# Patient Record
Sex: FEMALE | Race: WHITE | Employment: OTHER | URBAN - METROPOLITAN AREA
[De-identification: names, ages, dates, MRNs, and addresses within clinical notes are randomized per-mention and may not be internally consistent; named-entity substitution may affect disease eponyms.]

---

## 2017-03-30 ENCOUNTER — OFFICE VISIT (OUTPATIENT)
Dept: CARDIOLOGY CLINIC | Age: 67
End: 2017-03-30

## 2017-03-30 VITALS
SYSTOLIC BLOOD PRESSURE: 140 MMHG | HEIGHT: 63 IN | WEIGHT: 164.8 LBS | DIASTOLIC BLOOD PRESSURE: 70 MMHG | HEART RATE: 62 BPM | BODY MASS INDEX: 29.2 KG/M2

## 2017-03-30 DIAGNOSIS — I10 ESSENTIAL HYPERTENSION: Primary | ICD-10-CM

## 2017-03-30 DIAGNOSIS — I77.810 AORTIC ROOT DILATION (HCC): ICD-10-CM

## 2017-03-30 DIAGNOSIS — R00.2 PALPITATIONS: ICD-10-CM

## 2017-03-30 PROCEDURE — G8484 FLU IMMUNIZE NO ADMIN: HCPCS | Performed by: INTERNAL MEDICINE

## 2017-03-30 PROCEDURE — 1036F TOBACCO NON-USER: CPT | Performed by: INTERNAL MEDICINE

## 2017-03-30 PROCEDURE — 3014F SCREEN MAMMO DOC REV: CPT | Performed by: INTERNAL MEDICINE

## 2017-03-30 PROCEDURE — 1123F ACP DISCUSS/DSCN MKR DOCD: CPT | Performed by: INTERNAL MEDICINE

## 2017-03-30 PROCEDURE — G8399 PT W/DXA RESULTS DOCUMENT: HCPCS | Performed by: INTERNAL MEDICINE

## 2017-03-30 PROCEDURE — 3017F COLORECTAL CA SCREEN DOC REV: CPT | Performed by: INTERNAL MEDICINE

## 2017-03-30 PROCEDURE — 1090F PRES/ABSN URINE INCON ASSESS: CPT | Performed by: INTERNAL MEDICINE

## 2017-03-30 PROCEDURE — G8427 DOCREV CUR MEDS BY ELIG CLIN: HCPCS | Performed by: INTERNAL MEDICINE

## 2017-03-30 PROCEDURE — 4040F PNEUMOC VAC/ADMIN/RCVD: CPT | Performed by: INTERNAL MEDICINE

## 2017-03-30 PROCEDURE — G8598 ASA/ANTIPLAT THER USED: HCPCS | Performed by: INTERNAL MEDICINE

## 2017-03-30 PROCEDURE — G8420 CALC BMI NORM PARAMETERS: HCPCS | Performed by: INTERNAL MEDICINE

## 2017-03-30 PROCEDURE — 99214 OFFICE O/P EST MOD 30 MIN: CPT | Performed by: INTERNAL MEDICINE

## 2017-05-02 ENCOUNTER — OFFICE VISIT (OUTPATIENT)
Dept: FAMILY MEDICINE CLINIC | Age: 67
End: 2017-05-02

## 2017-05-02 VITALS
HEART RATE: 66 BPM | WEIGHT: 163 LBS | BODY MASS INDEX: 28.88 KG/M2 | DIASTOLIC BLOOD PRESSURE: 72 MMHG | SYSTOLIC BLOOD PRESSURE: 124 MMHG | HEIGHT: 63 IN | RESPIRATION RATE: 16 BRPM | TEMPERATURE: 97.3 F | OXYGEN SATURATION: 97 %

## 2017-05-02 DIAGNOSIS — E78.1 HYPERTRIGLYCERIDEMIA: Chronic | ICD-10-CM

## 2017-05-02 DIAGNOSIS — G89.4 CHRONIC PAIN SYNDROME: ICD-10-CM

## 2017-05-02 DIAGNOSIS — M85.80 OSTEOPENIA: ICD-10-CM

## 2017-05-02 DIAGNOSIS — Z00.00 MEDICARE WELCOME EXAM: Primary | ICD-10-CM

## 2017-05-02 LAB
ANION GAP SERPL CALCULATED.3IONS-SCNC: 12 MMOL/L (ref 3–16)
BUN BLDV-MCNC: 26 MG/DL (ref 7–20)
CALCIUM SERPL-MCNC: 10.1 MG/DL (ref 8.3–10.6)
CHLORIDE BLD-SCNC: 104 MMOL/L (ref 99–110)
CHOLESTEROL, TOTAL: 155 MG/DL (ref 0–199)
CO2: 26 MMOL/L (ref 21–32)
CREAT SERPL-MCNC: 0.8 MG/DL (ref 0.6–1.2)
GFR AFRICAN AMERICAN: >60
GFR NON-AFRICAN AMERICAN: >60
GLUCOSE BLD-MCNC: 103 MG/DL (ref 70–99)
HDLC SERPL-MCNC: 44 MG/DL (ref 40–60)
LDL CHOLESTEROL CALCULATED: 83 MG/DL
POTASSIUM SERPL-SCNC: 4.1 MMOL/L (ref 3.5–5.1)
SODIUM BLD-SCNC: 142 MMOL/L (ref 136–145)
TRIGL SERPL-MCNC: 141 MG/DL (ref 0–150)
VLDLC SERPL CALC-MCNC: 28 MG/DL

## 2017-05-02 PROCEDURE — G0403 EKG FOR INITIAL PREVENT EXAM: HCPCS | Performed by: FAMILY MEDICINE

## 2017-05-02 PROCEDURE — 90732 PPSV23 VACC 2 YRS+ SUBQ/IM: CPT | Performed by: FAMILY MEDICINE

## 2017-05-02 PROCEDURE — G0009 ADMIN PNEUMOCOCCAL VACCINE: HCPCS | Performed by: FAMILY MEDICINE

## 2017-05-02 PROCEDURE — G0402 INITIAL PREVENTIVE EXAM: HCPCS | Performed by: FAMILY MEDICINE

## 2017-05-02 RX ORDER — HYDROCODONE BITARTRATE AND ACETAMINOPHEN 5; 325 MG/1; MG/1
1 TABLET ORAL EVERY 8 HOURS PRN
Qty: 45 TABLET | Refills: 0 | Status: CANCELLED | OUTPATIENT
Start: 2017-05-02

## 2017-05-02 RX ORDER — HYDROCODONE BITARTRATE AND ACETAMINOPHEN 5; 325 MG/1; MG/1
1 TABLET ORAL EVERY 8 HOURS PRN
Qty: 30 TABLET | Refills: 0 | Status: SHIPPED | OUTPATIENT
Start: 2017-05-02 | End: 2020-09-16

## 2017-05-02 ASSESSMENT — PATIENT HEALTH QUESTIONNAIRE - PHQ9: SUM OF ALL RESPONSES TO PHQ QUESTIONS 1-9: 0

## 2017-05-02 ASSESSMENT — LIFESTYLE VARIABLES
HOW OFTEN DO YOU HAVE A DRINK CONTAINING ALCOHOL: 0
HAVE YOU OR SOMEONE ELSE BEEN INJURED AS A RESULT OF YOUR DRINKING: 0
HOW OFTEN DURING THE LAST YEAR HAVE YOU FAILED TO DO WHAT WAS NORMALLY EXPECTED FROM YOU BECAUSE OF DRINKING: 0
AUDIT-C TOTAL SCORE: 0
AUDIT TOTAL SCORE: 0
HOW MANY STANDARD DRINKS CONTAINING ALCOHOL DO YOU HAVE ON A TYPICAL DAY: 0
HOW OFTEN DURING THE LAST YEAR HAVE YOU BEEN UNABLE TO REMEMBER WHAT HAPPENED THE NIGHT BEFORE BECAUSE YOU HAD BEEN DRINKING: 0
HAS A RELATIVE, FRIEND, DOCTOR, OR ANOTHER HEALTH PROFESSIONAL EXPRESSED CONCERN ABOUT YOUR DRINKING OR SUGGESTED YOU CUT DOWN: 0
HOW OFTEN DURING THE LAST YEAR HAVE YOU HAD A FEELING OF GUILT OR REMORSE AFTER DRINKING: 0
HOW OFTEN DO YOU HAVE SIX OR MORE DRINKS ON ONE OCCASION: 0
HOW OFTEN DURING THE LAST YEAR HAVE YOU FOUND THAT YOU WERE NOT ABLE TO STOP DRINKING ONCE YOU HAD STARTED: 0
HOW OFTEN DURING THE LAST YEAR HAVE YOU NEEDED AN ALCOHOLIC DRINK FIRST THING IN THE MORNING TO GET YOURSELF GOING AFTER A NIGHT OF HEAVY DRINKING: 0

## 2017-05-02 ASSESSMENT — ANXIETY QUESTIONNAIRES: GAD7 TOTAL SCORE: 1

## 2017-05-10 ENCOUNTER — HOSPITAL ENCOUNTER (OUTPATIENT)
Dept: GENERAL RADIOLOGY | Age: 67
Discharge: HOME | End: 2017-05-10
Attending: FAMILY MEDICINE | Admitting: FAMILY MEDICINE

## 2017-05-10 DIAGNOSIS — M85.80 OSTEOPENIA: ICD-10-CM

## 2017-05-10 DIAGNOSIS — M85.80 OTHER SPECIFIED DISORDERS OF BONE DENSITY AND STRUCTURE, UNSPECIFIED SITE: ICD-10-CM

## 2017-05-11 ENCOUNTER — PATIENT MESSAGE (OUTPATIENT)
Dept: FAMILY MEDICINE CLINIC | Age: 67
End: 2017-05-11

## 2017-06-09 DIAGNOSIS — M81.0 OSTEOPOROSIS: Chronic | ICD-10-CM

## 2017-06-12 RX ORDER — ALENDRONATE SODIUM 35 MG/1
TABLET ORAL
Qty: 12 TABLET | Refills: 1 | Status: SHIPPED | OUTPATIENT
Start: 2017-06-12 | End: 2017-11-17 | Stop reason: SDUPTHER

## 2017-09-08 ENCOUNTER — OFFICE VISIT (OUTPATIENT)
Dept: CARDIOLOGY CLINIC | Age: 67
End: 2017-09-08

## 2017-09-08 ENCOUNTER — HOSPITAL ENCOUNTER (OUTPATIENT)
Dept: NON INVASIVE DIAGNOSTICS | Age: 67
Discharge: HOME | End: 2017-09-08
Attending: INTERNAL MEDICINE | Admitting: INTERNAL MEDICINE

## 2017-09-08 VITALS
WEIGHT: 164 LBS | SYSTOLIC BLOOD PRESSURE: 140 MMHG | OXYGEN SATURATION: 95 % | HEART RATE: 90 BPM | HEIGHT: 63 IN | DIASTOLIC BLOOD PRESSURE: 80 MMHG | BODY MASS INDEX: 29.06 KG/M2

## 2017-09-08 DIAGNOSIS — I10 ESSENTIAL (PRIMARY) HYPERTENSION: ICD-10-CM

## 2017-09-08 DIAGNOSIS — R93.1 ABNORMAL ECHOCARDIOGRAM: ICD-10-CM

## 2017-09-08 DIAGNOSIS — R00.2 PALPITATIONS: ICD-10-CM

## 2017-09-08 DIAGNOSIS — I10 ESSENTIAL HYPERTENSION: ICD-10-CM

## 2017-09-08 DIAGNOSIS — I77.810 DILATED AORTIC ROOT (HCC): ICD-10-CM

## 2017-09-08 DIAGNOSIS — R00.1 SINUS BRADYCARDIA: Primary | Chronic | ICD-10-CM

## 2017-09-08 LAB
LV EF: 55 %
LVEF MODALITY: NORMAL

## 2017-09-08 PROCEDURE — 3017F COLORECTAL CA SCREEN DOC REV: CPT | Performed by: INTERNAL MEDICINE

## 2017-09-08 PROCEDURE — 4040F PNEUMOC VAC/ADMIN/RCVD: CPT | Performed by: INTERNAL MEDICINE

## 2017-09-08 PROCEDURE — G8417 CALC BMI ABV UP PARAM F/U: HCPCS | Performed by: INTERNAL MEDICINE

## 2017-09-08 PROCEDURE — 99213 OFFICE O/P EST LOW 20 MIN: CPT | Performed by: INTERNAL MEDICINE

## 2017-09-08 PROCEDURE — G8598 ASA/ANTIPLAT THER USED: HCPCS | Performed by: INTERNAL MEDICINE

## 2017-09-08 PROCEDURE — G8427 DOCREV CUR MEDS BY ELIG CLIN: HCPCS | Performed by: INTERNAL MEDICINE

## 2017-09-08 PROCEDURE — G8399 PT W/DXA RESULTS DOCUMENT: HCPCS | Performed by: INTERNAL MEDICINE

## 2017-09-08 PROCEDURE — 1123F ACP DISCUSS/DSCN MKR DOCD: CPT | Performed by: INTERNAL MEDICINE

## 2017-09-08 PROCEDURE — 1090F PRES/ABSN URINE INCON ASSESS: CPT | Performed by: INTERNAL MEDICINE

## 2017-09-08 PROCEDURE — 3014F SCREEN MAMMO DOC REV: CPT | Performed by: INTERNAL MEDICINE

## 2017-09-08 PROCEDURE — 1036F TOBACCO NON-USER: CPT | Performed by: INTERNAL MEDICINE

## 2017-09-08 RX ORDER — AMLODIPINE BESYLATE 5 MG/1
5 TABLET ORAL DAILY
Qty: 90 TABLET | Refills: 3 | Status: SHIPPED | OUTPATIENT
Start: 2017-09-08 | End: 2018-03-07 | Stop reason: SDUPTHER

## 2017-09-08 RX ORDER — HYDROCHLOROTHIAZIDE 25 MG/1
25 TABLET ORAL DAILY
Qty: 90 TABLET | Refills: 3 | Status: SHIPPED | OUTPATIENT
Start: 2017-09-08 | End: 2018-09-14 | Stop reason: SDUPTHER

## 2017-11-17 DIAGNOSIS — M81.0 OSTEOPOROSIS: Chronic | ICD-10-CM

## 2017-11-17 RX ORDER — FENOFIBRATE 160 MG/1
TABLET ORAL
Qty: 90 TABLET | Refills: 3 | Status: SHIPPED | OUTPATIENT
Start: 2017-11-17 | End: 2018-09-29 | Stop reason: SDUPTHER

## 2017-11-17 RX ORDER — ALENDRONATE SODIUM 35 MG/1
TABLET ORAL
Qty: 12 TABLET | Refills: 1 | Status: SHIPPED | OUTPATIENT
Start: 2017-11-17 | End: 2018-05-31 | Stop reason: SDUPTHER

## 2018-03-05 NOTE — PROGRESS NOTES
memory, mentation, or behavior are noted      Echo 9/8/17:  Technically limited study due to lung interface. Normal left ventricle size, wall thickness and systolic function with an  estimated ejection fraction of 55%. No regional wall motion abnormalities  are seen. There is reversal of E/A inflow velocities across the mitral valve. Trivial mitral regurgitation is present. The aortic root is mildly dilated. Echo 9/15/16:  Summary   Normal left ventricle size, wall thickness and systolic function with an   estimated ejection fraction of 60%.   Trivial mitral regurgitation is present.   The aortic valve appears possibly bicuspid .   Mild aortic regurgitation is present.   The aortic root is mildly dilated. 4.1 to 4.3 cm.   Trivial tricuspid regurgitation. Gale Juan pulmonic regurgitation present.     MCOT 3/31/16:  Short run of atrial tachycardia but patient was asymptomatic  Patient reports occasional, brief skipped beats   Treatment options including medical therapy was discussed with patient. Risks, benefits and alternative of each treatment     CT Abd/Pelvis 10/7/14:  CT chest with IV      HISTORY: Abnormal echocardiogram, aortic root dilatation   Aortic root is dilated measuring 4.5 x 4.3 cm without   intimo-medial flap. Mid aortic arch segment measures 2.7 cm in   diameter and mid descending segment measures 2.4 cm. Pulmonary arterial trunk, main pulmonary arteries and proximal   arterial branches are patent. There is no intrathoracic or   axillary lymphadenopathy. Thyroid gland is normal in size. Trachea and mainstem bronchi are patent. Lungs are clear. 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 Carotid duplex 7/24/14:  Impressions   Right Impression   The right internal carotid artery demonstrates no significant stenosis.    Left Impression   The left internal carotid artery appears to have a 1-15% diameter reducing   stenosis Diltiazem  - she has occasional palpitations      Plan:   1. Increase Amlodipine to 7.5 mg daily  2. Continue to monitor BP at home and purchase new automatic BP cuff using arm measurements. Call with an update on BP measurements within the next month. 3.  Lipids to be repeated by PCP    4.  Echo in Sept 2018. 5.  Follow up in Sept 2018. Thank you for allowing me to participate in the care of this individual.    Scribe's attestation: This note was scribed in the presence of Kale Delgado M.D. by Susan Corona RN    Physician Attestation: The scribe's documentation has been prepared under my direction and personally reviewed by me in its entirety. I confirm that the note above accurately reflects all work, treatment, procedures, and medical decision making performed by me. Bernard Nguyen M.D., [facility]

## 2018-03-07 ENCOUNTER — OFFICE VISIT (OUTPATIENT)
Dept: CARDIOLOGY CLINIC | Age: 68
End: 2018-03-07

## 2018-03-07 VITALS
DIASTOLIC BLOOD PRESSURE: 90 MMHG | HEIGHT: 63 IN | OXYGEN SATURATION: 95 % | HEART RATE: 76 BPM | SYSTOLIC BLOOD PRESSURE: 148 MMHG | BODY MASS INDEX: 29.68 KG/M2 | WEIGHT: 167.5 LBS

## 2018-03-07 DIAGNOSIS — I77.810 DILATED AORTIC ROOT (HCC): Primary | ICD-10-CM

## 2018-03-07 DIAGNOSIS — E78.1 HYPERTRIGLYCERIDEMIA: ICD-10-CM

## 2018-03-07 DIAGNOSIS — I10 ESSENTIAL HYPERTENSION: ICD-10-CM

## 2018-03-07 DIAGNOSIS — R93.1 ABNORMAL ECHOCARDIOGRAM: ICD-10-CM

## 2018-03-07 DIAGNOSIS — R00.1 SINUS BRADYCARDIA: Chronic | ICD-10-CM

## 2018-03-07 DIAGNOSIS — R00.2 PALPITATIONS: ICD-10-CM

## 2018-03-07 PROCEDURE — G8399 PT W/DXA RESULTS DOCUMENT: HCPCS | Performed by: INTERNAL MEDICINE

## 2018-03-07 PROCEDURE — 3014F SCREEN MAMMO DOC REV: CPT | Performed by: INTERNAL MEDICINE

## 2018-03-07 PROCEDURE — 3017F COLORECTAL CA SCREEN DOC REV: CPT | Performed by: INTERNAL MEDICINE

## 2018-03-07 PROCEDURE — 1090F PRES/ABSN URINE INCON ASSESS: CPT | Performed by: INTERNAL MEDICINE

## 2018-03-07 PROCEDURE — G8598 ASA/ANTIPLAT THER USED: HCPCS | Performed by: INTERNAL MEDICINE

## 2018-03-07 PROCEDURE — G8417 CALC BMI ABV UP PARAM F/U: HCPCS | Performed by: INTERNAL MEDICINE

## 2018-03-07 PROCEDURE — 1036F TOBACCO NON-USER: CPT | Performed by: INTERNAL MEDICINE

## 2018-03-07 PROCEDURE — G8484 FLU IMMUNIZE NO ADMIN: HCPCS | Performed by: INTERNAL MEDICINE

## 2018-03-07 PROCEDURE — 1123F ACP DISCUSS/DSCN MKR DOCD: CPT | Performed by: INTERNAL MEDICINE

## 2018-03-07 PROCEDURE — 99214 OFFICE O/P EST MOD 30 MIN: CPT | Performed by: INTERNAL MEDICINE

## 2018-03-07 PROCEDURE — G8427 DOCREV CUR MEDS BY ELIG CLIN: HCPCS | Performed by: INTERNAL MEDICINE

## 2018-03-07 PROCEDURE — 4040F PNEUMOC VAC/ADMIN/RCVD: CPT | Performed by: INTERNAL MEDICINE

## 2018-03-09 RX ORDER — AMLODIPINE BESYLATE 5 MG/1
7.5 TABLET ORAL DAILY
Qty: 135 TABLET | Refills: 3 | Status: SHIPPED | OUTPATIENT
Start: 2018-03-09 | End: 2018-03-12 | Stop reason: SDUPTHER

## 2018-03-12 ENCOUNTER — TELEPHONE (OUTPATIENT)
Dept: CARDIOLOGY CLINIC | Age: 68
End: 2018-03-12

## 2018-03-12 DIAGNOSIS — I10 ESSENTIAL HYPERTENSION: ICD-10-CM

## 2018-03-12 DIAGNOSIS — R93.1 ABNORMAL ECHOCARDIOGRAM: ICD-10-CM

## 2018-03-12 DIAGNOSIS — R00.2 PALPITATIONS: ICD-10-CM

## 2018-03-12 DIAGNOSIS — R00.1 SINUS BRADYCARDIA: Chronic | ICD-10-CM

## 2018-03-12 RX ORDER — AMLODIPINE BESYLATE 5 MG/1
7.5 TABLET ORAL DAILY
Qty: 135 TABLET | Refills: 3 | Status: SHIPPED | OUTPATIENT
Start: 2018-03-12 | End: 2019-05-17 | Stop reason: SDUPTHER

## 2018-03-12 NOTE — TELEPHONE ENCOUNTER
Please correct the script for amLODIPine (NORVASC) 5 MG tablet that was sent to [facility] on 3/9/18    Please cancel and send to [facility]. Please call to confirm.   Thank you CSF

## 2018-03-27 ENCOUNTER — TELEPHONE (OUTPATIENT)
Dept: CARDIOLOGY CLINIC | Age: 68
End: 2018-03-27

## 2018-03-27 RX ORDER — AMLODIPINE BESYLATE 2.5 MG/1
TABLET ORAL
Qty: 30 TABLET | Refills: 5 | Status: SHIPPED | OUTPATIENT
Start: 2018-03-27 | End: 2018-09-14 | Stop reason: SDUPTHER

## 2018-03-27 NOTE — TELEPHONE ENCOUNTER
Amlodipine was increased to 7.5mg but [facility] her 5mg . The 5mg are not scored and are very hard to break. Could [facility] call in new rx for 2.5mg ? [facility] said to make sure daniel puts on the rx that she is still taking the 5mg and has them at home . She wants to take the 5mg and the 2.5mg pills together . Call to let her know this is done .

## 2018-05-14 ENCOUNTER — OFFICE VISIT (OUTPATIENT)
Dept: FAMILY MEDICINE CLINIC | Age: 68
End: 2018-05-14

## 2018-05-14 VITALS
WEIGHT: 167 LBS | OXYGEN SATURATION: 98 % | HEART RATE: 67 BPM | BODY MASS INDEX: 29.59 KG/M2 | DIASTOLIC BLOOD PRESSURE: 74 MMHG | HEIGHT: 63 IN | SYSTOLIC BLOOD PRESSURE: 110 MMHG | RESPIRATION RATE: 16 BRPM | TEMPERATURE: 97.7 F

## 2018-05-14 DIAGNOSIS — R10.13 EPIGASTRIC PAIN: ICD-10-CM

## 2018-05-14 DIAGNOSIS — G89.29 CHRONIC BILATERAL LOW BACK PAIN WITHOUT SCIATICA: ICD-10-CM

## 2018-05-14 DIAGNOSIS — Z23 NEED FOR VACCINATION: ICD-10-CM

## 2018-05-14 DIAGNOSIS — I10 BENIGN ESSENTIAL HTN: ICD-10-CM

## 2018-05-14 DIAGNOSIS — M54.50 CHRONIC BILATERAL LOW BACK PAIN WITHOUT SCIATICA: ICD-10-CM

## 2018-05-14 DIAGNOSIS — E55.9 VITAMIN D DEFICIENCY: ICD-10-CM

## 2018-05-14 DIAGNOSIS — Z00.00 MEDICARE ANNUAL WELLNESS VISIT, SUBSEQUENT: Primary | ICD-10-CM

## 2018-05-14 LAB
ANION GAP SERPL CALCULATED.3IONS-SCNC: 13 MMOL/L (ref 3–16)
BUN BLDV-MCNC: 17 MG/DL (ref 7–20)
CALCIUM SERPL-MCNC: 10.1 MG/DL (ref 8.3–10.6)
CHLORIDE BLD-SCNC: 105 MMOL/L (ref 99–110)
CHOLESTEROL, TOTAL: 160 MG/DL (ref 0–199)
CO2: 25 MMOL/L (ref 21–32)
CREAT SERPL-MCNC: 0.7 MG/DL (ref 0.6–1.2)
CREATININE URINE: 125.4 MG/DL (ref 28–259)
GFR AFRICAN AMERICAN: >60
GFR NON-AFRICAN AMERICAN: >60
GLUCOSE BLD-MCNC: 114 MG/DL (ref 70–99)
HDLC SERPL-MCNC: 40 MG/DL (ref 40–60)
LDL CHOLESTEROL CALCULATED: 89 MG/DL
LIPASE: 88 U/L (ref 13–60)
MAGNESIUM: 1.9 MG/DL (ref 1.8–2.4)
MICROALBUMIN UR-MCNC: 1.4 MG/DL
MICROALBUMIN/CREAT UR-RTO: 11.2 MG/G (ref 0–30)
POTASSIUM SERPL-SCNC: 4.3 MMOL/L (ref 3.5–5.1)
SODIUM BLD-SCNC: 143 MMOL/L (ref 136–145)
TRIGL SERPL-MCNC: 156 MG/DL (ref 0–150)
TSH SERPL DL<=0.05 MIU/L-ACNC: 0.7 UIU/ML (ref 0.27–4.2)
VITAMIN D 25-HYDROXY: 24.2 NG/ML
VLDLC SERPL CALC-MCNC: 31 MG/DL

## 2018-05-14 PROCEDURE — G0009 ADMIN PNEUMOCOCCAL VACCINE: HCPCS | Performed by: FAMILY MEDICINE

## 2018-05-14 PROCEDURE — 4040F PNEUMOC VAC/ADMIN/RCVD: CPT | Performed by: FAMILY MEDICINE

## 2018-05-14 PROCEDURE — G0439 PPPS, SUBSEQ VISIT: HCPCS | Performed by: FAMILY MEDICINE

## 2018-05-14 PROCEDURE — 90715 TDAP VACCINE 7 YRS/> IM: CPT | Performed by: FAMILY MEDICINE

## 2018-05-14 PROCEDURE — 90471 IMMUNIZATION ADMIN: CPT | Performed by: FAMILY MEDICINE

## 2018-05-14 PROCEDURE — 90670 PCV13 VACCINE IM: CPT | Performed by: FAMILY MEDICINE

## 2018-05-14 PROCEDURE — G8598 ASA/ANTIPLAT THER USED: HCPCS | Performed by: FAMILY MEDICINE

## 2018-05-14 RX ORDER — PANTOPRAZOLE SODIUM 40 MG/1
40 GRANULE, DELAYED RELEASE ORAL
COMMUNITY
End: 2020-09-16 | Stop reason: ALTCHOICE

## 2018-05-14 ASSESSMENT — PATIENT HEALTH QUESTIONNAIRE - PHQ9
2. FEELING DOWN, DEPRESSED OR HOPELESS: 0
2. FEELING DOWN, DEPRESSED OR HOPELESS: 0
1. LITTLE INTEREST OR PLEASURE IN DOING THINGS: 0
SUM OF ALL RESPONSES TO PHQ9 QUESTIONS 1 & 2: 0
1. LITTLE INTEREST OR PLEASURE IN DOING THINGS: 0
SUM OF ALL RESPONSES TO PHQ9 QUESTIONS 1 & 2: 0
SUM OF ALL RESPONSES TO PHQ9 QUESTIONS 1 & 2: 0
2. FEELING DOWN, DEPRESSED OR HOPELESS: 0
SUM OF ALL RESPONSES TO PHQ QUESTIONS 1-9: 0
1. LITTLE INTEREST OR PLEASURE IN DOING THINGS: 0
SUM OF ALL RESPONSES TO PHQ QUESTIONS 1-9: 0
SUM OF ALL RESPONSES TO PHQ QUESTIONS 1-9: 0

## 2018-05-31 DIAGNOSIS — M81.0 OSTEOPOROSIS: Chronic | ICD-10-CM

## 2018-06-01 RX ORDER — ALENDRONATE SODIUM 35 MG/1
TABLET ORAL
Qty: 12 TABLET | Refills: 1 | Status: SHIPPED | OUTPATIENT
Start: 2018-06-01 | End: 2018-11-21 | Stop reason: SDUPTHER

## 2018-09-12 NOTE — PROGRESS NOTES
tablet Take 1.5 tablets by mouth daily Yes Dedra Merino MD   fenofibrate 160 MG tablet TAKE 1 TABLET DAILY Yes Alissa Borrego MD   HYDROcodone-acetaminophen (NORCO) 5-325 MG per tablet Take 1 tablet by mouth every 8 hours as needed for Pain . Yes Alissa Borrego MD   aspirin 81 MG EC tablet Take 81 mg by mouth daily  Yes Historical Provider, MD   Multiple Vitamins-Minerals (THERAPEUTIC MULTIVITAMIN-MINERALS) tablet Take 1 tablet by mouth daily. Yes Historical Provider, MD   ibuprofen (IBU) 800 MG tablet Take 1 tablet by mouth every 8 hours as needed for Pain. Alissa Borrego MD        Allergies   Allergen Reactions    Nsaids Other (See Comments)     PUD       Past Medical History:   Diagnosis Date    Aorta aneurysm [facility]) Oct 2014    echo and CT mild ao dilatation root/arch     Cyst of left kidney Oct 2014    by CT followed by urology    Fatty liver Oct 2014    on CT     Hx of endoscopy     gastric ulcer    Hyperlipidemia     Osteoarthritis     Osteopenia     PUD (peptic ulcer disease)     nsaid     Syncope     remote, carotid us normal        Past Surgical History:   Procedure Laterality Date    CARPAL TUNNEL RELEASE      2004    COLONOSCOPY  12/16/2015    Dr. Eliseo Aguilera, mild diverticulosis    UPPER GASTROINTESTINAL ENDOSCOPY  5/9/2013    , Gastric Ulcer. Social History   Substance Use Topics    Smoking status: Former Smoker     Quit date: 11/4/2008    Smokeless tobacco: Never Used    Alcohol use No        Family History   Problem Relation Age of Onset    COPD Mother         tobacco abuse,     Cancer Father         lymphoma    Coronary Art Dis Father     Cancer Maternal Aunt         liver cancer.      Other Sister         kidney cyst,FRANZ lung disease    Cancer Daughter 40        Hodkin's (passed on Jan 012)     Thyroid Disease Daughter 45        Graves     High Blood Pressure Neg Hx     High Cholesterol Neg Hx     Heart Disease Neg Hx        PHYSICAL EXAMINATION:  Vitals:    09/14/18 1011   BP: 126/70   Site: Left Upper Arm   Position: Sitting   Cuff Size: Medium Adult   Pulse: 66   SpO2: 98%   Weight: 167 lb 12.8 oz (76.1 kg)   Height: 5' 3\" (1.6 m)     Estimated body mass index is 29.72 kg/m² as calculated from the following:    Height as of this encounter: 5' 3\" (1.6 m). Weight as of this encounter: 167 lb 12.8 oz (76.1 kg). General Appearance: No apparent distress  Eyes:  · Conjunctiva clear  · Pupils equal, round, reactive to light  ENT:  · External Ears and Nose unremarkable  · Oral mucosa is moist  Respiratory:  · Normal excursion and expansion without use of accessory muscles  · Resp Auscultation: Normal breath sounds without dullness  Cardiovascular:  · JVD is normal  · The carotid upstroke is normal in amplitude and contour without delay or bruit  · Apical impulse is not displaced  · Normal S1, S2. No S3. No Murmur  · No edema  · Pedal Pulses: 2+ and equal   Abdomen:  · No masses or tenderness  · Liver/Spleen: No Abnormalities Noted  Musculoskeletal:  · Fingers without clubbing or cyanosis  · Normal Gait  Skin:  · No rash  · Normal skin turgor   Neurologic/Psychiatric:  · Alert and oriented in all spheres  · Normal mood and affect  · Memory and mentation intact    Lab Results   Component Value Date    CHOL 160 05/14/2018    TRIG 156 05/14/2018    HDL 40 05/14/2018    HDL 44 12/15/2011    LDLCALC 89 05/14/2018     Lab Results   Component Value Date     05/14/2018    K 4.3 05/14/2018     05/14/2018    CO2 25 05/14/2018    GLUCOSE 114 05/14/2018    BUN 17 05/14/2018    CREATININE 0.7 05/14/2018    CALCIUM 10.1 05/14/2018    GFR >60 01/08/2013    GFRAA >60 05/14/2018    GFRAA >60 01/08/2013     Lab Results   Component Value Date    ALT 24 06/07/2016    AST 26 06/07/2016       Echo 9/14/18:   Summary   -Normal left ventricle size, wall thickness and systolic function with an   estimated ejection fraction of 55-60%.  No regional wall motion abnormalities   are seen.   -Normal diastolic function.   -Mild aortic regurgitation.   -Mild mitral regurgitation.   -Mild tricuspid regurgitation with a estimated RVSP of 30 mmHg.   -Trivial pulmonic regurgitation present.   -The aortic root is dilated, measuring 4.3 cm. Echo 9/8/17:  Technically limited study due to lung interface. Normal left ventricle size, wall thickness and systolic function with an  estimated ejection fraction of 55%. No regional wall motion abnormalities  are seen. There is reversal of E/A inflow velocities across the mitral valve. Trivial mitral regurgitation is present. The aortic root is mildly dilated. Echo 9/15/16:  Summary   Normal left ventricle size, wall thickness and systolic function with an   estimated ejection fraction of 60%. Trivial mitral regurgitation is present. The aortic valve appears possibly bicuspid . Mild aortic regurgitation is present. The aortic root is mildly dilated. 4.1 to 4.3 cm. Trivial tricuspid regurgitation. .   Trivial pulmonic regurgitation present. MCOT 3/31/16:  Short run of atrial tachycardia but patient was asymptomatic  Patient reports occasional, brief skipped beats   Treatment options including medical therapy was discussed with patient. Risks, benefits and alternative of each treatment      CT Abd/Pelvis 10/7/14:  CT chest with IV       HISTORY: Abnormal echocardiogram, aortic root dilatation   Aortic root is dilated measuring 4.5 x 4.3 cm without   intimo-medial flap. Mid aortic arch segment measures 2.7 cm in   diameter and mid descending segment measures 2.4 cm. Pulmonary arterial trunk, main pulmonary arteries and proximal   arterial branches are patent. There is no intrathoracic or   axillary lymphadenopathy. Thyroid gland is normal in size. Trachea and mainstem bronchi are patent. Lungs are clear.     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 TG- 156, TC- 160. Treated with Fenofibrate 160 mg daily. Plan > continue current dose of Fenofibrate. Labs monitored by PCP    4. Palpitations  ~  hx of skipped beats  ~  3/3016 Event monitor - brief AT which was asymptomatic  ~  Hx of bradycardia - not on BB or Diltiazem  ~  HR in the 50's in am and increases when active. No complaints of dizziness, good exercise tolerance    Plan > monitor for worsening palpitations. No change in medications at this time. Plan:  1. No change in medications  2. Continue to monitor BP at home  3. Will discuss follow up testing for surveillance of aortic root at next visit  4. Follow up in one year      Scribe's attestation: This note was scribed in the presence of Chanelle Bryan M.D. by Hasmukh Chandler RN    Physician Attestation: The scribe's documentation has been prepared under my direction and personally reviewed by me in its entirety. I confirm that the note above accurately reflects all work, treatment, procedures, and medical decision making performed by me. An  electronic signature was used to authenticate this note. Tomi Saldana MD, [facility] [place], [address]

## 2018-09-14 ENCOUNTER — OFFICE VISIT (OUTPATIENT)
Dept: CARDIOLOGY CLINIC | Age: 68
End: 2018-09-14

## 2018-09-14 ENCOUNTER — HOSPITAL ENCOUNTER (OUTPATIENT)
Dept: NON INVASIVE DIAGNOSTICS | Age: 68
Discharge: HOME | End: 2018-09-14
Attending: INTERNAL MEDICINE | Admitting: INTERNAL MEDICINE

## 2018-09-14 VITALS
WEIGHT: 167.8 LBS | DIASTOLIC BLOOD PRESSURE: 70 MMHG | SYSTOLIC BLOOD PRESSURE: 126 MMHG | BODY MASS INDEX: 29.73 KG/M2 | HEIGHT: 63 IN | OXYGEN SATURATION: 98 % | HEART RATE: 66 BPM

## 2018-09-14 DIAGNOSIS — R93.1 ABNORMAL ECHOCARDIOGRAM: ICD-10-CM

## 2018-09-14 DIAGNOSIS — I77.810 THORACIC AORTIC ECTASIA (HCC): ICD-10-CM

## 2018-09-14 DIAGNOSIS — R00.2 PALPITATIONS: ICD-10-CM

## 2018-09-14 DIAGNOSIS — I77.810 DILATED AORTIC ROOT (HCC): Primary | ICD-10-CM

## 2018-09-14 DIAGNOSIS — I77.810 DILATED AORTIC ROOT (HCC): ICD-10-CM

## 2018-09-14 DIAGNOSIS — R00.1 SINUS BRADYCARDIA: Chronic | ICD-10-CM

## 2018-09-14 DIAGNOSIS — I10 ESSENTIAL HYPERTENSION: ICD-10-CM

## 2018-09-14 DIAGNOSIS — E78.1 HYPERTRIGLYCERIDEMIA: ICD-10-CM

## 2018-09-14 LAB
LEFT VENTRICULAR EJECTION FRACTION HIGH VALUE: 60 %
LEFT VENTRICULAR EJECTION FRACTION MODE: NORMAL
LV EF: 55 %
LV EF: 58 %
LVEF MODALITY: NORMAL

## 2018-09-14 PROCEDURE — G8399 PT W/DXA RESULTS DOCUMENT: HCPCS | Performed by: INTERNAL MEDICINE

## 2018-09-14 PROCEDURE — G8417 CALC BMI ABV UP PARAM F/U: HCPCS | Performed by: INTERNAL MEDICINE

## 2018-09-14 PROCEDURE — 4040F PNEUMOC VAC/ADMIN/RCVD: CPT | Performed by: INTERNAL MEDICINE

## 2018-09-14 PROCEDURE — 99214 OFFICE O/P EST MOD 30 MIN: CPT | Performed by: INTERNAL MEDICINE

## 2018-09-14 PROCEDURE — G8427 DOCREV CUR MEDS BY ELIG CLIN: HCPCS | Performed by: INTERNAL MEDICINE

## 2018-09-14 PROCEDURE — 1036F TOBACCO NON-USER: CPT | Performed by: INTERNAL MEDICINE

## 2018-09-14 PROCEDURE — 3017F COLORECTAL CA SCREEN DOC REV: CPT | Performed by: INTERNAL MEDICINE

## 2018-09-14 PROCEDURE — G8598 ASA/ANTIPLAT THER USED: HCPCS | Performed by: INTERNAL MEDICINE

## 2018-09-14 PROCEDURE — 1090F PRES/ABSN URINE INCON ASSESS: CPT | Performed by: INTERNAL MEDICINE

## 2018-09-14 PROCEDURE — 1123F ACP DISCUSS/DSCN MKR DOCD: CPT | Performed by: INTERNAL MEDICINE

## 2018-09-14 PROCEDURE — 1101F PT FALLS ASSESS-DOCD LE1/YR: CPT | Performed by: INTERNAL MEDICINE

## 2018-09-14 RX ORDER — HYDROCHLOROTHIAZIDE 25 MG/1
25 TABLET ORAL DAILY
Qty: 90 TABLET | Refills: 3 | Status: SHIPPED | OUTPATIENT
Start: 2018-09-14 | End: 2019-07-26 | Stop reason: SDUPTHER

## 2018-09-14 RX ORDER — VITAMIN B COMPLEX
TABLET ORAL DAILY
COMMUNITY

## 2018-09-14 RX ORDER — AMLODIPINE BESYLATE 2.5 MG/1
TABLET ORAL
Qty: 90 TABLET | Refills: 3 | Status: SHIPPED | OUTPATIENT
Start: 2018-09-14 | End: 2019-07-29 | Stop reason: SDUPTHER

## 2019-01-15 RX ORDER — FENOFIBRATE 160 MG/1
TABLET ORAL
Qty: 90 TABLET | Refills: 0 | Status: SHIPPED | OUTPATIENT
Start: 2019-01-15 | End: 2019-04-12 | Stop reason: SDUPTHER

## 2019-04-12 ENCOUNTER — TELEPHONE (OUTPATIENT)
Dept: CARDIOLOGY CLINIC | Age: 69
End: 2019-04-12

## 2019-04-12 DIAGNOSIS — R00.2 PALPITATIONS: ICD-10-CM

## 2019-04-12 DIAGNOSIS — I77.810 DILATED AORTIC ROOT (HCC): Primary | ICD-10-CM

## 2019-04-12 DIAGNOSIS — R93.1 ABNORMAL ECHOCARDIOGRAM: ICD-10-CM

## 2019-04-12 RX ORDER — FENOFIBRATE 160 MG/1
TABLET ORAL
Qty: 90 TABLET | Refills: 0 | Status: SHIPPED | OUTPATIENT
Start: 2019-04-12 | End: 2019-06-30 | Stop reason: SDUPTHER

## 2019-04-12 NOTE — TELEPHONE ENCOUNTER
I called pt to pura devi with [facility] and pt states she always has a Dopplar done prior to appt. There are no orders for any test, does pt need one done?  Pls call to advise Thank you

## 2019-04-12 NOTE — TELEPHONE ENCOUNTER
Patient has a history of dilated aortic root. Aortic root measured at 4.3 cm per 9/14/18 Echo. Per office note from 9/14/18 >> will discuss with radiologist if CT scan or echo would be better for surveillance of aortic root or if both tests should be performed periodically. Will discuss recommendations for testing with patient at next visit. Has this discussion occurred and should we schedule testing before visit, day of visit, or discuss testing further at the visit?

## 2019-04-23 NOTE — TELEPHONE ENCOUNTER
Per Dr. Liana Avina, we should schedule an echo either before the visit or on the same day as the visit. Echo order placed in Epic.

## 2019-04-24 ENCOUNTER — TELEPHONE (OUTPATIENT)
Dept: CARDIOLOGY CLINIC | Age: 69
End: 2019-04-24

## 2019-04-24 NOTE — TELEPHONE ENCOUNTER
Pt would like to know if she needed a CAT scan and an ECHO in September? Please call pt to advise and sched annual appt.

## 2019-04-27 DIAGNOSIS — M81.0 OSTEOPOROSIS: Chronic | ICD-10-CM

## 2019-04-29 RX ORDER — ALENDRONATE SODIUM 35 MG/1
TABLET ORAL
Qty: 12 TABLET | Refills: 0 | Status: SHIPPED | OUTPATIENT
Start: 2019-04-29 | End: 2019-09-19 | Stop reason: SDUPTHER

## 2019-05-16 ENCOUNTER — OFFICE VISIT (OUTPATIENT)
Dept: FAMILY MEDICINE CLINIC | Age: 69
End: 2019-05-16
Payer: MEDICARE

## 2019-05-16 VITALS
WEIGHT: 168.4 LBS | HEART RATE: 68 BPM | HEIGHT: 63 IN | SYSTOLIC BLOOD PRESSURE: 124 MMHG | OXYGEN SATURATION: 97 % | BODY MASS INDEX: 29.84 KG/M2 | DIASTOLIC BLOOD PRESSURE: 68 MMHG | RESPIRATION RATE: 16 BRPM | TEMPERATURE: 97.1 F

## 2019-05-16 DIAGNOSIS — Z00.00 WELL ADULT EXAM: ICD-10-CM

## 2019-05-16 DIAGNOSIS — M81.0 OSTEOPOROSIS, UNSPECIFIED OSTEOPOROSIS TYPE, UNSPECIFIED PATHOLOGICAL FRACTURE PRESENCE: Primary | ICD-10-CM

## 2019-05-16 DIAGNOSIS — M85.80 OSTEOPENIA, UNSPECIFIED LOCATION: Primary | ICD-10-CM

## 2019-05-16 LAB
A/G RATIO: 1.5 (ref 1.1–2.2)
ALBUMIN SERPL-MCNC: 4.8 G/DL (ref 3.4–5)
ALP BLD-CCNC: 45 U/L (ref 40–129)
ALT SERPL-CCNC: 26 U/L (ref 10–40)
ANION GAP SERPL CALCULATED.3IONS-SCNC: 14 MMOL/L (ref 3–16)
AST SERPL-CCNC: 32 U/L (ref 15–37)
BILIRUB SERPL-MCNC: 0.4 MG/DL (ref 0–1)
BUN BLDV-MCNC: 15 MG/DL (ref 7–20)
CALCIUM SERPL-MCNC: 10.8 MG/DL (ref 8.3–10.6)
CHLORIDE BLD-SCNC: 101 MMOL/L (ref 99–110)
CHOLESTEROL, TOTAL: 171 MG/DL (ref 0–199)
CO2: 25 MMOL/L (ref 21–32)
CREAT SERPL-MCNC: 0.7 MG/DL (ref 0.6–1.2)
GFR AFRICAN AMERICAN: >60
GFR NON-AFRICAN AMERICAN: >60
GLOBULIN: 3.1 G/DL
GLUCOSE BLD-MCNC: 126 MG/DL (ref 70–99)
HDLC SERPL-MCNC: 45 MG/DL (ref 40–60)
LDL CHOLESTEROL CALCULATED: 98 MG/DL
POTASSIUM SERPL-SCNC: 4.3 MMOL/L (ref 3.5–5.1)
SODIUM BLD-SCNC: 140 MMOL/L (ref 136–145)
TOTAL PROTEIN: 7.9 G/DL (ref 6.4–8.2)
TRIGL SERPL-MCNC: 139 MG/DL (ref 0–150)
VLDLC SERPL CALC-MCNC: 28 MG/DL

## 2019-05-16 PROCEDURE — G0439 PPPS, SUBSEQ VISIT: HCPCS | Performed by: FAMILY MEDICINE

## 2019-05-16 ASSESSMENT — PATIENT HEALTH QUESTIONNAIRE - PHQ9
SUM OF ALL RESPONSES TO PHQ9 QUESTIONS 1 & 2: 0
SUM OF ALL RESPONSES TO PHQ QUESTIONS 1-9: 0
2. FEELING DOWN, DEPRESSED OR HOPELESS: 0
1. LITTLE INTEREST OR PLEASURE IN DOING THINGS: 0
SUM OF ALL RESPONSES TO PHQ QUESTIONS 1-9: 0

## 2019-05-16 NOTE — PROGRESS NOTES
Medicare Annual Wellness Visit  Name: Roberta Bravo Date: 2019   MRN: [id number] Sex: Female   Age: 80 y.o. Ethnicity: Non-/Non    : 1950 Race: Lona is here for Annual Exam    Screenings for behavioral, psychosocial and functional/safety risks, and cognitive dysfunction are all negative except as indicated below. These results, as well as other patient data from the [address] form, are documented in Flowsheets linked to this Encounter. POA and living will utd  Depression screening PHQ2 negative      Allergies   Allergen Reactions    Nsaids Other (See Comments)     PUD     Prior to Visit Medications    Medication Sig Taking? Authorizing Provider   alendronate (FOSAMAX) 35 MG tablet TAKE AS INSTRUCTED BY YOUR PRESCRIBER Yes Hui Trammell MD   fenofibrate 160 MG tablet TAKE 1 TABLET DAILY Yes Flower Méndez MD   Cholecalciferol (VITAMIN D) 2000 units CAPS capsule Take by mouth daily Yes Historical Provider, MD   hydrochlorothiazide (HYDRODIURIL) 25 MG tablet Take 1 tablet by mouth daily Yes Suzanna Funez MD   amLODIPine (NORVASC) 2.5 MG tablet Patient is to take 2.5 mg daily with a 5mg tablet to make 7.5mg daily. Yes Suzanna Funez MD   pantoprazole sodium (PROTONIX) 40 MG PACK packet Take 40 mg by mouth every morning (before breakfast) Yes Historical Provider, MD   amLODIPine (NORVASC) 5 MG tablet Take 1.5 tablets by mouth daily Yes Suzanna Funez MD   HYDROcodone-acetaminophen (NORCO) 5-325 MG per tablet Take 1 tablet by mouth every 8 hours as needed for Pain . Yes Hui Trammell MD   aspirin 81 MG EC tablet Take 81 mg by mouth daily  Yes Historical Provider, MD   Multiple Vitamins-Minerals (THERAPEUTIC MULTIVITAMIN-MINERALS) tablet Take 1 tablet by mouth daily. Yes Historical Provider, MD   ibuprofen (IBU) 800 MG tablet Take 1 tablet by mouth every 8 hours as needed for Pain.   Hui Trammell MD     Past Medical History: Diagnosis Date    Aorta aneurysm [facility]) Oct 2014    echo and CT mild ao dilatation root/arch     Cyst of left kidney Oct 2014    by CT followed by urology    Fatty liver Oct 2014    on CT     Hx of endoscopy     gastric ulcer    Hyperlipidemia     Osteoarthritis     Osteopenia     PUD (peptic ulcer disease)     nsaid     Syncope     remote, carotid us normal      Past Surgical History:   Procedure Laterality Date    CARPAL TUNNEL RELEASE      2004    COLONOSCOPY  12/16/2015    Dr. Lisandro Kraft, mild diverticulosis    UPPER GASTROINTESTINAL ENDOSCOPY  5/9/2013    , Gastric Ulcer. Family History   Problem Relation Age of Onset    COPD Mother         tobacco abuse,     Cancer Father         lymphoma    Coronary Art Dis Father     Cancer Maternal Aunt         liver cancer.  Other Sister         kidney cyst,FRANZ lung disease    Cancer Daughter 40        Hodkin's (passed on Jan 012)     Thyroid Disease Daughter 45        Graves     High Blood Pressure Neg Hx     High Cholesterol Neg Hx     Heart Disease Neg Hx        CareTeam (Including outside providers/suppliers regularly involved in providing care):   Patient Care Team:  Celi Buchanan MD as PCP - General (Family Medicine)  Celi Buchanan MD as PCP - [facility] Attributed Provider  Krysten Sampson MD as Consulting Physician (Cardiology)    Wt Readings from Last 3 Encounters:   05/16/19 168 lb 6.4 oz (76.4 kg)   09/14/18 167 lb 12.8 oz (76.1 kg)   05/14/18 167 lb (75.8 kg)     Vitals:    05/16/19 0744   BP: 124/68   Pulse: 68   Resp: 16   Temp: 97.1 °F (36.2 °C)   TempSrc: Tympanic   SpO2: 97%   Weight: 168 lb 6.4 oz (76.4 kg)   Height: 5' 3\" (1.6 m)     Body mass index is 29.83 kg/m². Based upon direct observation of the patient, evaluation of cognition reveals recent and remote memory intact.     General Appearance: alert and oriented to person, place and time, well developed and well- nourished, in no acute distress  Skin: warm and dry, no rash or erythema  Head: normocephalic and atraumatic  Eyes: pupils equal, round, and reactive to light, extraocular eye movements intact, conjunctivae normal  ENT: tympanic membrane, external ear and ear canal normal bilaterally, nose without deformity, nasal mucosa and turbinates normal without polyps  Neck: supple and non-tender without mass, no thyromegaly or thyroid nodules, no cervical lymphadenopathy  Pulmonary/Chest: clear to auscultation bilaterally- no wheezes, rales or rhonchi, normal air movement, no respiratory distress  Cardiovascular: normal rate, regular rhythm, normal S1 and S2, no murmurs, rubs, clicks, or gallops, distal pulses intact, no carotid bruits  Abdomen: soft, non-tender, non-distended, normal bowel sounds, no masses or organomegaly  Extremities: no cyanosis, clubbing or edema  Musculoskeletal: normal range of motion, no joint swelling, deformity or tenderness  Neurologic: reflexes normal and symmetric, no cranial nerve deficit, gait, coordination and speech normal    Patient's complete Health Risk Assessment and screening values have been reviewed and are found in Flowsheets. The following problems were reviewed today and where indicated follow up appointments were made and/or referrals ordered. Positive Risk Factor Screenings with Interventions:     No Positive Risk Factors identified today.     Personalized Preventive Plan   Current Health Maintenance Status  Immunization History   Administered Date(s) Administered    Influenza Vaccine, unspecified formulation 10/25/2006, 09/03/2009, 11/01/2016    Influenza Virus Vaccine 10/03/2013, 09/22/2014, 10/27/2015    Pneumococcal 13-valent Conjugate ([id number]) 05/14/2018    Pneumococcal Polysaccharide ([id number]) 05/02/2017    Td, unspecified formulation 11/20/2008    Tdap (Boostrix, Adacel) 05/14/2018    Zoster Live (Zostavax) 11/21/2015        Health Maintenance   Topic Date Due    Potassium monitoring  05/14/2019    Creatinine monitoring  05/14/2019    Shingles Vaccine (2 of 3) 05/04/2020 (Originally 1/16/2016)    Diabetes screen  06/07/2019    Flu vaccine (Season Ended) 09/01/2019    Breast cancer screen  05/10/2020    Lipid screen  05/14/2023    Colon cancer screen colonoscopy  12/16/2025    DTaP/Tdap/Td vaccine (2 - Td) 05/14/2028    DEXA (modify frequency per FRAX score)  Completed    Pneumococcal 65+ years Vaccine  Completed    Hepatitis C screen  Completed     Recommendations for Preventive Services Due: see orders and patient instructions/AVS.  .   Recommended screening schedule for the next 5-10 years is provided to the patient in written form: see Patient Instructions/AVS.

## 2019-05-17 DIAGNOSIS — R00.2 PALPITATIONS: ICD-10-CM

## 2019-05-17 DIAGNOSIS — I10 ESSENTIAL HYPERTENSION: ICD-10-CM

## 2019-05-17 DIAGNOSIS — R00.1 SINUS BRADYCARDIA: Chronic | ICD-10-CM

## 2019-05-17 DIAGNOSIS — R73.9 HYPERGLYCEMIA: Primary | Chronic | ICD-10-CM

## 2019-05-17 DIAGNOSIS — R93.1 ABNORMAL ECHOCARDIOGRAM: ICD-10-CM

## 2019-05-17 RX ORDER — AMLODIPINE BESYLATE 5 MG/1
TABLET ORAL
Qty: 135 TABLET | Refills: 3 | Status: SHIPPED | OUTPATIENT
Start: 2019-05-17 | End: 2020-06-16

## 2019-05-22 ENCOUNTER — HOSPITAL ENCOUNTER (OUTPATIENT)
Dept: GENERAL RADIOLOGY | Age: 69
Discharge: HOME | End: 2019-05-22
Payer: MEDICARE

## 2019-05-22 DIAGNOSIS — M81.0 OSTEOPOROSIS, UNSPECIFIED OSTEOPOROSIS TYPE, UNSPECIFIED PATHOLOGICAL FRACTURE PRESENCE: ICD-10-CM

## 2019-05-22 DIAGNOSIS — R73.9 HYPERGLYCEMIA: Chronic | ICD-10-CM

## 2019-05-22 PROCEDURE — 77080 DXA BONE DENSITY AXIAL: CPT

## 2019-05-23 LAB
ESTIMATED AVERAGE GLUCOSE: 116.9 MG/DL
HBA1C MFR BLD: 5.7 %

## 2019-07-01 RX ORDER — FENOFIBRATE 160 MG/1
TABLET ORAL
Qty: 90 TABLET | Refills: 0 | Status: SHIPPED | OUTPATIENT
Start: 2019-07-01 | End: 2019-09-19 | Stop reason: SDUPTHER

## 2019-07-26 DIAGNOSIS — R93.1 ABNORMAL ECHOCARDIOGRAM: ICD-10-CM

## 2019-07-26 DIAGNOSIS — R00.2 PALPITATIONS: ICD-10-CM

## 2019-07-26 DIAGNOSIS — R00.1 SINUS BRADYCARDIA: Chronic | ICD-10-CM

## 2019-07-26 RX ORDER — HYDROCHLOROTHIAZIDE 25 MG/1
TABLET ORAL
Qty: 90 TABLET | Refills: 3 | Status: SHIPPED | OUTPATIENT
Start: 2019-07-26 | End: 2020-06-16

## 2019-07-26 RX ORDER — AMLODIPINE BESYLATE 2.5 MG/1
TABLET ORAL
Qty: 90 TABLET | Refills: 3 | OUTPATIENT
Start: 2019-07-26

## 2019-07-26 NOTE — TELEPHONE ENCOUNTER
[facility] not appropriate [facility] on 5/17/2019 Disp 135+3 on Amlodipine 5 mg tablets.     Lov 9/14/2018  Labs 5/16/2019  Lrf 9/14/2018 Disp 90+3  Appt 9/25/2019  Please advise thanks

## 2019-07-29 ENCOUNTER — TELEPHONE (OUTPATIENT)
Dept: CARDIOLOGY CLINIC | Age: 69
End: 2019-07-29

## 2019-07-29 RX ORDER — AMLODIPINE BESYLATE 2.5 MG/1
TABLET ORAL
Qty: 90 TABLET | Refills: 3 | Status: SHIPPED | OUTPATIENT
Start: 2019-07-29 | End: 2020-06-16 | Stop reason: SDUPTHER

## 2019-09-19 DIAGNOSIS — M81.0 OSTEOPOROSIS: Chronic | ICD-10-CM

## 2019-09-19 RX ORDER — ALENDRONATE SODIUM 35 MG/1
TABLET ORAL
Qty: 12 TABLET | Refills: 4 | Status: SHIPPED | OUTPATIENT
Start: 2019-09-19 | End: 2020-09-16

## 2019-09-19 RX ORDER — FENOFIBRATE 160 MG/1
TABLET ORAL
Qty: 90 TABLET | Refills: 4 | Status: SHIPPED | OUTPATIENT
Start: 2019-09-19 | End: 2020-09-22 | Stop reason: SDUPTHER

## 2019-09-25 ENCOUNTER — OFFICE VISIT (OUTPATIENT)
Dept: CARDIOLOGY CLINIC | Age: 69
End: 2019-09-25
Payer: MEDICARE

## 2019-09-25 ENCOUNTER — TELEPHONE (OUTPATIENT)
Dept: CARDIOLOGY CLINIC | Age: 69
End: 2019-09-25

## 2019-09-25 ENCOUNTER — HOSPITAL ENCOUNTER (OUTPATIENT)
Dept: NON INVASIVE DIAGNOSTICS | Age: 69
Discharge: HOME | End: 2019-09-25
Payer: MEDICARE

## 2019-09-25 VITALS
BODY MASS INDEX: 29.55 KG/M2 | DIASTOLIC BLOOD PRESSURE: 60 MMHG | HEIGHT: 63 IN | OXYGEN SATURATION: 98 % | SYSTOLIC BLOOD PRESSURE: 112 MMHG | HEART RATE: 67 BPM | WEIGHT: 166.8 LBS

## 2019-09-25 DIAGNOSIS — E78.1 HYPERTRIGLYCERIDEMIA: ICD-10-CM

## 2019-09-25 DIAGNOSIS — R00.2 PALPITATIONS: Primary | ICD-10-CM

## 2019-09-25 DIAGNOSIS — R93.1 ABNORMAL ECHOCARDIOGRAM: ICD-10-CM

## 2019-09-25 DIAGNOSIS — I77.810 DILATED AORTIC ROOT (HCC): ICD-10-CM

## 2019-09-25 DIAGNOSIS — R68.89 OTHER GENERAL SYMPTOMS AND SIGNS: ICD-10-CM

## 2019-09-25 DIAGNOSIS — R00.2 PALPITATIONS: ICD-10-CM

## 2019-09-25 DIAGNOSIS — I10 ESSENTIAL HYPERTENSION: ICD-10-CM

## 2019-09-25 LAB
LV EF: 55 %
LVEF MODALITY: NORMAL

## 2019-09-25 PROCEDURE — 1036F TOBACCO NON-USER: CPT | Performed by: INTERNAL MEDICINE

## 2019-09-25 PROCEDURE — 4040F PNEUMOC VAC/ADMIN/RCVD: CPT | Performed by: INTERNAL MEDICINE

## 2019-09-25 PROCEDURE — 93306 TTE W/DOPPLER COMPLETE: CPT

## 2019-09-25 PROCEDURE — G8419 CALC BMI OUT NRM PARAM NOF/U: HCPCS | Performed by: INTERNAL MEDICINE

## 2019-09-25 PROCEDURE — G8427 DOCREV CUR MEDS BY ELIG CLIN: HCPCS | Performed by: INTERNAL MEDICINE

## 2019-09-25 PROCEDURE — G8399 PT W/DXA RESULTS DOCUMENT: HCPCS | Performed by: INTERNAL MEDICINE

## 2019-09-25 PROCEDURE — 3017F COLORECTAL CA SCREEN DOC REV: CPT | Performed by: INTERNAL MEDICINE

## 2019-09-25 PROCEDURE — G8598 ASA/ANTIPLAT THER USED: HCPCS | Performed by: INTERNAL MEDICINE

## 2019-09-25 PROCEDURE — 1090F PRES/ABSN URINE INCON ASSESS: CPT | Performed by: INTERNAL MEDICINE

## 2019-09-25 PROCEDURE — 99214 OFFICE O/P EST MOD 30 MIN: CPT | Performed by: INTERNAL MEDICINE

## 2019-09-25 PROCEDURE — 1123F ACP DISCUSS/DSCN MKR DOCD: CPT | Performed by: INTERNAL MEDICINE

## 2019-09-25 NOTE — LETTER
The aortic root is mildly dilated. 4.1 to 4.3 cm. Trivial tricuspid regurgitation. .   Trivial pulmonic regurgitation present. MCOT 3/31/16:  Short run of atrial tachycardia but patient was asymptomatic  Patient reports occasional, brief skipped beats   Treatment options including medical therapy was discussed with patient. Risks, benefits and alternative of each treatment      CT Abd/Pelvis 10/7/14:  CT chest with IV       HISTORY: Abnormal echocardiogram, aortic root dilatation   Aortic root is dilated measuring 4.5 x 4.3 cm without   intimo-medial flap. Mid aortic arch segment measures 2.7 cm in   diameter and mid descending segment measures 2.4 cm. Pulmonary arterial trunk, main pulmonary arteries and proximal   arterial branches are patent. There is no intrathoracic or   axillary lymphadenopathy. Thyroid gland is normal in size. Trachea and mainstem bronchi are patent. Lungs are clear. 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 Carotid duplex 7/24/14:  Impressions   Right Impression   The right internal carotid artery demonstrates no significant stenosis. Left Impression   The left internal carotid artery appears to have a 1-15% diameter reducing   stenosis based on velocity criteria. Echo 7/24/14:  Normal left ventricle size and systolic function with an estimated. Ejection fraction of 55%. No regional wall motion abnormalities are seen. There is mild concentric left ventricular hypertrophy. There is reversal of E/A inflow velocities across the mitral valve suggesting impaired left ventricular relaxation. E/e\"= 14. Trace of mitral, pulmonic and tricuspid regurgitation. RVSP 22mmHg. Mildly dilated aortic root and ascending aorta. 4.1-4.2cm. Mild to moderate aortic insufficiency. Normal stress echocardiogram study. Decreased sensitivity d/t failure to reach target heart rate.       ASSESSMENT  Dilated aortic root ([address] ~ 7/2014 Echo - mildly dilated aortic root and ascending aorta (4.1- 4.2 cm)  ~  10/2014 CT of abd and pelvis - aortic root is dilated measuring 4.5 x 4.3 cm without intimo-medial flap. Mid aortic arch segment measures 2.7 cm and mid descending segment measures 2.4 cm.   ~  9/2016 Echo -Mild aortic regurgitation, aortic root mildly dilated. 4.1 to 4.3 cm.  ~  9/2017 Echo  Mildly dilated aortic root (3.9 cm)   ~ 9/12/18 Echo - AR 4.3 cm  ~ 9/25/19 Echo- AR 4 cm    Plan > CT chest to compare to recent echo    Essential hypertension  ~ Controlled on Amlodipine, hctz  ~ 's to 120's/60's-70's, stable  ~ Blood pressure 112/60, pulse 67, height 5' 3\" (1.6 m), weight 166 lb 12.8 oz (75.7 kg), SpO2 98 %, not currently breastfeeding. Plan> Continue current medications    Hypertriglyceridemia  ~ 5/2019:   HDL 45 LDL 98  ~ Treated with Fenofibrate 160 mg daily. Plan > Continue Fenofibrate, labs monitored by PCP    Palpitations  ~  hx of skipped beats  ~  3/3016 Event monitor - brief AT which was asymptomatic  ~  Hx of bradycardia - not on BB or Diltiazem  ~  HR in the 60's at home per logs    Plan > Monitor for more frequent/worsening palpitations, no changes at this time      PLAN:  Complete CT chest  Follow up in 1 year with echo same day      Scribe's Attestation: This note was scribed in the presence of Dr. Angela Fuentes MD by Michoacano Mora RN. Physician Attestation: The scribe's documentation has been prepared under my direction and personally reviewed by me in its entirety. I confirm that the note above accurately reflects all work, treatment, procedures, and medical decision making performed by me. An  electronic signature was used to authenticate this note. Bonita Fuentes MD, [facility]      Sincerely,        Maritza Gabriel MD

## 2019-09-25 NOTE — PROGRESS NOTES
9/25/19 Echo- AR 4 cm    Plan > CT chest to compare to recent echo    Essential hypertension  ~ Controlled on Amlodipine, hctz  ~ 's to 120's/60's-70's, stable  ~ Blood pressure 112/60, pulse 67, height 5' 3\" (1.6 m), weight 166 lb 12.8 oz (75.7 kg), SpO2 98 %, not currently breastfeeding. Plan> Continue current medications    Hypertriglyceridemia  ~ 5/2019:   HDL 45 LDL 98  ~ Treated with Fenofibrate 160 mg daily. Plan > Continue Fenofibrate, labs monitored by PCP    Palpitations  ~  hx of skipped beats  ~  3/3016 Event monitor - brief AT which was asymptomatic  ~  Hx of bradycardia - not on BB or Diltiazem  ~  HR in the 60's at home per logs    Plan > Monitor for more frequent/worsening palpitations, no changes at this time      PLAN:  Complete CT chest  Follow up in 1 year with echo same day      Scribe's Attestation: This note was scribed in the presence of Dr. Loco Smith MD by Teresa Brown RN. Physician Attestation: The scribe's documentation has been prepared under my direction and personally reviewed by me in its entirety. I confirm that the note above accurately reflects all work, treatment, procedures, and medical decision making performed by me. An  electronic signature was used to authenticate this note. Pat Smith MD, [facility] [place], [address]

## 2019-10-11 ENCOUNTER — HOSPITAL ENCOUNTER (OUTPATIENT)
Dept: CT IMAGING | Age: 69
Discharge: HOME | End: 2019-10-11
Payer: MEDICARE

## 2019-10-11 ENCOUNTER — HOSPITAL ENCOUNTER (OUTPATIENT)
Age: 69
Discharge: HOME | End: 2019-10-11
Payer: MEDICARE

## 2019-10-11 DIAGNOSIS — I77.810 DILATED AORTIC ROOT (HCC): ICD-10-CM

## 2019-10-11 DIAGNOSIS — E78.1 HYPERTRIGLYCERIDEMIA: ICD-10-CM

## 2019-10-11 LAB
ALT SERPL-CCNC: 23 U/L (ref 10–40)
ANION GAP SERPL CALCULATED.3IONS-SCNC: 12 MMOL/L (ref 3–16)
AST SERPL-CCNC: 25 U/L (ref 15–37)
BUN BLDV-MCNC: 17 MG/DL (ref 7–20)
CALCIUM SERPL-MCNC: 10.5 MG/DL (ref 8.3–10.6)
CHLORIDE BLD-SCNC: 101 MMOL/L (ref 99–110)
CO2: 27 MMOL/L (ref 21–32)
CREAT SERPL-MCNC: 0.7 MG/DL (ref 0.6–1.2)
GFR AFRICAN AMERICAN: >60
GFR NON-AFRICAN AMERICAN: >60
GLUCOSE BLD-MCNC: 128 MG/DL (ref 70–99)
POTASSIUM SERPL-SCNC: 4 MMOL/L (ref 3.5–5.1)
SODIUM BLD-SCNC: 140 MMOL/L (ref 136–145)

## 2019-10-11 PROCEDURE — 80048 BASIC METABOLIC PNL TOTAL CA: CPT

## 2019-10-11 PROCEDURE — 84450 TRANSFERASE (AST) (SGOT): CPT

## 2019-10-11 PROCEDURE — 6360000004 HC RX CONTRAST MEDICATION: Performed by: INTERNAL MEDICINE

## 2019-10-11 PROCEDURE — 84460 ALANINE AMINO (ALT) (SGPT): CPT

## 2019-10-11 PROCEDURE — 36415 COLL VENOUS BLD VENIPUNCTURE: CPT

## 2019-10-11 PROCEDURE — 71270 CT THORAX DX C-/C+: CPT

## 2019-10-11 RX ADMIN — IOPAMIDOL 75 ML: 755 INJECTION, SOLUTION INTRAVENOUS at 09:55

## 2020-06-10 ENCOUNTER — HOSPITAL ENCOUNTER (OUTPATIENT)
Age: 70
Discharge: HOME | End: 2020-06-10
Payer: COMMERCIAL

## 2020-06-10 LAB
A/G RATIO: 1.5 (ref 1.1–2.2)
ALBUMIN SERPL-MCNC: 4.3 G/DL (ref 3.4–5)
ALP BLD-CCNC: 40 U/L (ref 40–129)
ALT SERPL-CCNC: 14 U/L (ref 10–40)
ANION GAP SERPL CALCULATED.3IONS-SCNC: 11 MMOL/L (ref 3–16)
AST SERPL-CCNC: 22 U/L (ref 15–37)
BASOPHILS ABSOLUTE: 0 K/UL (ref 0–0.2)
BASOPHILS RELATIVE PERCENT: 0.6 %
BILIRUB SERPL-MCNC: 0.3 MG/DL (ref 0–1)
BUN BLDV-MCNC: 15 MG/DL (ref 7–20)
CALCIUM SERPL-MCNC: 10.5 MG/DL (ref 8.3–10.6)
CHLORIDE BLD-SCNC: 104 MMOL/L (ref 99–110)
CO2: 25 MMOL/L (ref 21–32)
CREAT SERPL-MCNC: 0.7 MG/DL (ref 0.6–1.2)
EOSINOPHILS ABSOLUTE: 0.2 K/UL (ref 0–0.6)
EOSINOPHILS RELATIVE PERCENT: 2.6 %
ESTIMATED AVERAGE GLUCOSE: 111.2 MG/DL
FERRITIN: 106.8 NG/ML (ref 15–150)
GFR AFRICAN AMERICAN: >60
GFR NON-AFRICAN AMERICAN: >60
GLOBULIN: 2.8 G/DL
GLUCOSE BLD-MCNC: 115 MG/DL (ref 70–99)
HBA1C MFR BLD: 5.5 %
HCT VFR BLD CALC: 38.3 % (ref 36–48)
HEMOGLOBIN: 12.8 G/DL (ref 12–16)
LIPASE: 92 U/L (ref 13–60)
LYMPHOCYTES ABSOLUTE: 2.8 K/UL (ref 1–5.1)
LYMPHOCYTES RELATIVE PERCENT: 34.3 %
MCH RBC QN AUTO: 29.5 PG (ref 26–34)
MCHC RBC AUTO-ENTMCNC: 33.3 G/DL (ref 31–36)
MCV RBC AUTO: 88.5 FL (ref 80–100)
MONOCYTES ABSOLUTE: 0.6 K/UL (ref 0–1.3)
MONOCYTES RELATIVE PERCENT: 6.6 %
NEUTROPHILS ABSOLUTE: 4.6 K/UL (ref 1.7–7.7)
NEUTROPHILS RELATIVE PERCENT: 55.9 %
PDW BLD-RTO: 13.9 % (ref 12.4–15.4)
PLATELET # BLD: 326 K/UL (ref 135–450)
PMV BLD AUTO: 8.4 FL (ref 5–10.5)
POTASSIUM SERPL-SCNC: 4 MMOL/L (ref 3.5–5.1)
RBC # BLD: 4.33 M/UL (ref 4–5.2)
SODIUM BLD-SCNC: 140 MMOL/L (ref 136–145)
TOTAL PROTEIN: 7.1 G/DL (ref 6.4–8.2)
WBC # BLD: 8.3 K/UL (ref 4–11)

## 2020-06-10 PROCEDURE — 85025 COMPLETE CBC W/AUTO DIFF WBC: CPT

## 2020-06-10 PROCEDURE — 83690 ASSAY OF LIPASE: CPT

## 2020-06-10 PROCEDURE — 36415 COLL VENOUS BLD VENIPUNCTURE: CPT

## 2020-06-10 PROCEDURE — 83036 HEMOGLOBIN GLYCOSYLATED A1C: CPT

## 2020-06-10 PROCEDURE — 80053 COMPREHEN METABOLIC PANEL: CPT

## 2020-06-10 PROCEDURE — 82728 ASSAY OF FERRITIN: CPT

## 2020-06-15 NOTE — TELEPHONE ENCOUNTER
Last appt on 09/25/2019  Next alethea on - none - to Follow up in 1 year with echo same day    Last labs on 06/10/2020

## 2020-06-16 ENCOUNTER — TELEPHONE (OUTPATIENT)
Dept: CARDIOLOGY CLINIC | Age: 70
End: 2020-06-16

## 2020-06-16 RX ORDER — AMLODIPINE BESYLATE 5 MG/1
5 TABLET ORAL DAILY
Qty: 90 TABLET | Refills: 3
Start: 2020-06-16 | End: 2020-09-21

## 2020-06-16 RX ORDER — AMLODIPINE BESYLATE 2.5 MG/1
2.5 TABLET ORAL DAILY
Qty: 90 TABLET | Refills: 3 | Status: SHIPPED | OUTPATIENT
Start: 2020-06-16 | End: 2021-09-01

## 2020-06-16 RX ORDER — HYDROCHLOROTHIAZIDE 25 MG/1
TABLET ORAL
Qty: 90 TABLET | Refills: 3 | Status: SHIPPED | OUTPATIENT
Start: 2020-06-16 | End: 2021-05-17

## 2020-06-16 NOTE — TELEPHONE ENCOUNTER
I spoke with pt and she said she take a 5 and a 2.5 because her dose is 7.5. She ties splitting the 5's but it didn't work very well. So she takes both strengths together. She says that the 2.5 is all she needs thougbh.

## 2020-09-15 NOTE — PATIENT INSTRUCTIONS
1.  No change in medications  2. Fasting lipids, liver enzymes, and CK. 3.  Schedule Myoview GXT soon   4. Call office with any concerning cardiac symptoms  5.   Echo and office visit in one year

## 2020-09-15 NOTE — PROGRESS NOTES
[address]    2020    Rian Dempsey (:  1950) is a 77 y.o. female is here for follow-up and management of hypertension, dilated aortic root and aortic insufficiency. Referring Provider: Solo Rowe MD    HISTORY:  Ms. Stephan Swain has a history of dilated aortic root, hyperlipidemia and HTN. She is a former smoker (30 year history). Her parents had heart disease. She has seen Dr. Tiffanie Buchanan for palpitations. 3/2016 MCOT showed short run of AT, but was asymptomatic, skipped beats were associated with SR. She also has a history of bradycardia and is not on BB or Diltiazem.        Today, she denies chest discomfort, shortness of breath, lightheadedness, dizziness or palpitations. She states that she has been having more trouble with diverticular disease and had a recent exacerbation. REVIEW OF SYSTEMS:  A complete review of systems has been reviewed and updated today and is negative except as noted in the history of present illness. Prior to Visit Medications    Medication Sig Taking? Authorizing Provider   hydroCHLOROthiazide (HYDRODIURIL) 25 MG tablet TAKE 1 TABLET DAILY Yes Maribel Burk MD   amLODIPine (NORVASC) 5 MG tablet Take 1 tablet by mouth daily Yes Maribel Burk MD   amLODIPine (NORVASC) 2.5 MG tablet Take 1 tablet by mouth daily (along with 5 mg tablet prescribed separately) Yes Maribel Burk MD   fenofibrate 160 MG tablet TAKE 1 TABLET DAILY Yes Solo Rowe MD   Vitamin D (CHOLECALCIFEROL) 25 MCG (1000 UT) TABS tablet Take by mouth daily  Yes Historical Provider, MD   aspirin 81 MG EC tablet Take 81 mg by mouth daily  Yes Historical Provider, MD   Multiple Vitamins-Minerals (THERAPEUTIC MULTIVITAMIN-MINERALS) tablet Take 1 tablet by mouth daily.  Yes Historical Provider, MD        Allergies   Allergen Reactions    Nsaids Other (See Comments)     PUD       Past Medical History:   Diagnosis Date    Aorta aneurysm [facility]) Oct 2014    echo and CT mild ao dilatation root/arch     Cyst of left kidney Oct 2014    by CT followed by urology    Fatty liver Oct 2014    on CT     Hx of endoscopy     gastric ulcer    Hyperlipidemia     Osteoarthritis     Osteopenia     PUD (peptic ulcer disease)     nsaid     Syncope     remote, carotid us normal        Past Surgical History:   Procedure Laterality Date    CARPAL TUNNEL RELEASE      2004    COLONOSCOPY  2015    Dr. Lg John, mild diverticulosis    UPPER GASTROINTESTINAL ENDOSCOPY  2013    , Gastric Ulcer. Social History     Tobacco Use    Smoking status: Former Smoker     Last attempt to quit: 2008     Years since quittin.8    Smokeless tobacco: Never Used   Substance Use Topics    Alcohol use: No        Family History   Problem Relation Age of Onset    COPD Mother         tobacco abuse,     Cancer Father         lymphoma    Coronary Art Dis Father     Cancer Maternal Aunt         liver cancer.  Other Sister         kidney cyst,FRANZ lung disease    Cancer Daughter 40        Hodkin's (passed on )     Thyroid Disease Daughter 45        Graves     High Blood Pressure Neg Hx     High Cholesterol Neg Hx     Heart Disease Neg Hx        PHYSICAL EXAMINATION:  Vitals:    20 0912   BP: 118/62   Site: Left Upper Arm   Position: Sitting   Cuff Size: Medium Adult   Pulse: 67   SpO2: 97%   Weight: 163 lb (73.9 kg)   Height: 5' 3\" (1.6 m)     Estimated body mass index is 28.87 kg/m² as calculated from the following:    Height as of this encounter: 5' 3\" (1.6 m). Weight as of this encounter: 163 lb (73.9 kg). Physical Exam  Constitutional:       Appearance: She is well-developed. She is not diaphoretic. HENT:      Head: Normocephalic and atraumatic. Eyes:      General: No scleral icterus. Extraocular Movements: Extraocular movements intact.       Conjunctiva/sclera: Conjunctivae normal.   Neck:      Musculoskeletal: Normal range of motion and neck supple. Vascular: No JVD. Cardiovascular:      Rate and Rhythm: Normal rate and regular rhythm. Heart sounds: Normal heart sounds. No murmur. No friction rub. No gallop. Pulmonary:      Effort: Pulmonary effort is normal. No respiratory distress. Breath sounds: Normal breath sounds. No wheezing or rales. Abdominal:      General: Bowel sounds are normal.      Palpations: Abdomen is soft. Tenderness: There is no abdominal tenderness. Musculoskeletal: Normal range of motion. Skin:     General: Skin is warm and dry. Findings: No rash. Neurological:      General: No focal deficit present. Mental Status: She is alert and oriented to person, place, and time. Psychiatric:         Mood and Affect: Mood normal.         Behavior: Behavior normal.         Thought Content: Thought content normal.         Judgment: Judgment normal.         I have reviewed all pertinent lab results and diagnostic testing. Lab Results   Component Value Date    CHOL 171 05/16/2019    TRIG 139 05/16/2019    HDL 45 05/16/2019    HDL 44 12/15/2011    LDLCALC 98 05/16/2019     Lab Results   Component Value Date     06/10/2020    K 4.0 06/10/2020     06/10/2020    CO2 25 06/10/2020    GLUCOSE 115 06/10/2020    BUN 15 06/10/2020    CREATININE 0.7 06/10/2020    CALCIUM 10.5 06/10/2020    GFR >60 01/08/2013    GFRAA >60 06/10/2020    GFRAA >60 01/08/2013     Lab Results   Component Value Date    ALT 14 06/10/2020    AST 22 06/10/2020     Echo 9/16/20:      CT of chest w/wo contrast 10/11/19:  1. Mild dilation of the proximal aorta involving the sinuses of Valsalva and    sino-tubular junction.  Atherosclerotic calcification of the aortic valve may    indicate underlying stenosis as potential etiology.  Recommend correlation    with echocardiogram.    2. No significant pulmonary findings. 3. Incidental right adrenal adenoma and left renal cyst in the abdomen.     Hepatic steatosis is also skipped beats   Treatment options including medical therapy was discussed with patient. Risks, benefits and alternative of each treatment      CT Abd/Pelvis 10/7/14:  CT chest with IV       HISTORY: Abnormal echocardiogram, aortic root dilatation   Aortic root is dilated measuring 4.5 x 4.3 cm without   intimo-medial flap. Mid aortic arch segment measures 2.7 cm in   diameter and mid descending segment measures 2.4 cm. Pulmonary arterial trunk, main pulmonary arteries and proximal   arterial branches are patent. There is no intrathoracic or   axillary lymphadenopathy. Thyroid gland is normal in size. Trachea and mainstem bronchi are patent. Lungs are clear. 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 Carotid duplex 7/24/14:  Impressions   Right Impression   The right internal carotid artery demonstrates no significant stenosis. Left Impression   The left internal carotid artery appears to have a 1-15% diameter reducing   stenosis based on velocity criteria. Echo 7/24/14:  Normal left ventricle size and systolic function with an estimated. Ejection fraction of 55%. No regional wall motion abnormalities are seen. There is mild concentric left ventricular hypertrophy. There is reversal of E/A inflow velocities across the mitral valve suggesting impaired left ventricular relaxation. E/e\"= 14. Trace of mitral, pulmonic and tricuspid regurgitation. RVSP 22mmHg. Mildly dilated aortic root and ascending aorta. 4.1-4.2cm. Mild to moderate aortic insufficiency. Normal stress echocardiogram study. Decreased sensitivity d/t failure to reach target heart rate. ASSESSMENT  Dilated aortic root ([address]  ~  7/2014 Echo - mildly dilated AR and asc. aorta (4.1- 4.2 cm)  ~  10/2014 CT of abd and pelvis - AR dilated at 4.5 x 4.3 cm without intimo-medial flap.   Mid aortic arch segment measures 2.7 cm and mid descending segment measures 2.4 cm.   ~ 2016 to 2019 Echoes - AR mildly dilated between 3.9 and 4.3. Mostly mild AI noted  ~  10/11/19 CT of chest - Sinuses of Valsalva measures 4.5 cm, sinotubular junction measures 4.1 cm, mid ascending aorta measures 3.9 cm. Mild calcification of aortic valve.   ~  9/16/20 Echo -normal LV systolic function, mild to moderate aortic insufficiency, aortic root dilated but stable. Plan > stable. Echo in 1 year. Blood pressure control. Essential hypertension  ~ Controlled on Amlodipine 7.5 mg daily, hctz  ~ Blood pressure 118/62, pulse 67, height 5' 3\" (1.6 m), weight 163 lb (73.9 kg), SpO2 97 %, not currently breastfeeding. Plan> well-controlled. Her blood pressure monitor as well as blood pressures obtained during office visit correlate. Continue current antihypertensive regimen. Hypertriglyceridemia  ~ 5/16/19:   HDL 45 LDL 98  ~ Treated with Fenofibrate 160 mg daily. Plan > stable. Update lipids. Palpitations  ~  hx of skipped beats  ~  3/3016 Event monitor - brief AT which was asymptomatic  ~  Hx of bradycardia - not on BB or Diltiazem      Plan > stable. Per EP. Abnormal ECG    Plan > nonspecific T wave abnormality is a new finding compared to ECG 3 years ago. Recommend Myoview stress test for further cardiac risk assessment. PLAN:  1. Lipid profile. 2.  Echo in 1 year. 3.  Myoview stress test for abnormal ECG with new nonspecific T wave abnormality. 4.  RTC 1 year. Scribe's attestation: This note was scribed in the presence of Dom Lezama M.D. by Allyssa Blair RN     Physician Attestation: The scribe's documentation has been prepared under my direction and personally reviewed by me in its entirety. I confirm that the note above accurately reflects all work, treatment, procedures, and medical decision making performed by me. An  electronic signature was used to authenticate this note. Bev Dobson MD, [facility] [address], [address]

## 2020-09-16 ENCOUNTER — HOSPITAL ENCOUNTER (OUTPATIENT)
Dept: NON INVASIVE DIAGNOSTICS | Age: 70
Discharge: HOME | End: 2020-09-16
Payer: MEDICARE

## 2020-09-16 ENCOUNTER — OFFICE VISIT (OUTPATIENT)
Dept: CARDIOLOGY CLINIC | Age: 70
End: 2020-09-16
Payer: COMMERCIAL

## 2020-09-16 VITALS
HEART RATE: 67 BPM | BODY MASS INDEX: 28.88 KG/M2 | WEIGHT: 163 LBS | HEIGHT: 63 IN | DIASTOLIC BLOOD PRESSURE: 62 MMHG | SYSTOLIC BLOOD PRESSURE: 118 MMHG | OXYGEN SATURATION: 97 %

## 2020-09-16 LAB
LV EF: 63 %
LVEF MODALITY: NORMAL

## 2020-09-16 PROCEDURE — 1036F TOBACCO NON-USER: CPT | Performed by: INTERNAL MEDICINE

## 2020-09-16 PROCEDURE — G8399 PT W/DXA RESULTS DOCUMENT: HCPCS | Performed by: INTERNAL MEDICINE

## 2020-09-16 PROCEDURE — 3017F COLORECTAL CA SCREEN DOC REV: CPT | Performed by: INTERNAL MEDICINE

## 2020-09-16 PROCEDURE — 1090F PRES/ABSN URINE INCON ASSESS: CPT | Performed by: INTERNAL MEDICINE

## 2020-09-16 PROCEDURE — 4040F PNEUMOC VAC/ADMIN/RCVD: CPT | Performed by: INTERNAL MEDICINE

## 2020-09-16 PROCEDURE — 99214 OFFICE O/P EST MOD 30 MIN: CPT | Performed by: INTERNAL MEDICINE

## 2020-09-16 PROCEDURE — G8417 CALC BMI ABV UP PARAM F/U: HCPCS | Performed by: INTERNAL MEDICINE

## 2020-09-16 PROCEDURE — 1123F ACP DISCUSS/DSCN MKR DOCD: CPT | Performed by: INTERNAL MEDICINE

## 2020-09-16 PROCEDURE — G8427 DOCREV CUR MEDS BY ELIG CLIN: HCPCS | Performed by: INTERNAL MEDICINE

## 2020-09-16 PROCEDURE — 93000 ELECTROCARDIOGRAM COMPLETE: CPT | Performed by: INTERNAL MEDICINE

## 2020-09-16 PROCEDURE — 93306 TTE W/DOPPLER COMPLETE: CPT

## 2020-09-16 NOTE — LETTER
[address] [place] [address] Cardiology [place] [address] [address] [id number]  Phone: [phone]  Fax: [phone]    Sujey Gore MD        2020     Daisha King [address]    Patient: Bethany Watkins  MR Number: [id number]  YOB: 1950  Date of Visit: 2020    Dear Dr. Curt Randhawa:    Below are the relevant portions of my assessment and plan of care. [address]    2020    Bethany Watkins (:  1950) is a 77 y.o. female is here for follow-up and management of hypertension, dilated aortic root and aortic insufficiency. Referring Provider: Curt Randhawa MD    HISTORY:  Ms. Hernan Damico has a history of dilated aortic root, hyperlipidemia and HTN. She is a former smoker (30 year history). Her parents had heart disease. She has seen Dr. Antonette Zavala for palpitations. 3/2016 MCOT showed short run of AT, but was asymptomatic, skipped beats were associated with SR. She also has a history of bradycardia and is not on BB or Diltiazem.        Today, she denies chest discomfort, shortness of breath, lightheadedness, dizziness or palpitations. She states that she has been having more trouble with diverticular disease and had a recent exacerbation. REVIEW OF SYSTEMS:  A complete review of systems has been reviewed and updated today and is negative except as noted in the history of present illness. Prior to Visit Medications    Medication Sig Taking?  Authorizing Provider   hydroCHLOROthiazide (HYDRODIURIL) 25 MG tablet TAKE 1 TABLET DAILY Yes Sujey Gore MD   amLODIPine (NORVASC) 5 MG tablet Take 1 tablet by mouth daily Yes Sujey Gore MD   amLODIPine (NORVASC) 2.5 MG tablet Take 1 tablet by mouth daily (along with 5 mg tablet prescribed separately) Yes Sujey Gore MD   fenofibrate 160 MG tablet TAKE 1 TABLET DAILY Yes Curt Randhawa MD Vitamin D (CHOLECALCIFEROL) 25 MCG (1000 UT) TABS tablet Take by mouth daily  Yes Historical Provider, MD   aspirin 81 MG EC tablet Take 81 mg by mouth daily  Yes Historical Provider, MD   Multiple Vitamins-Minerals (THERAPEUTIC MULTIVITAMIN-MINERALS) tablet Take 1 tablet by mouth daily. Yes Historical Provider, MD        Allergies   Allergen Reactions    Nsaids Other (See Comments)     PUD       Past Medical History:   Diagnosis Date    Aorta aneurysm [facility]) Oct 2014    echo and CT mild ao dilatation root/arch     Cyst of left kidney Oct 2014    by CT followed by urology    Fatty liver Oct 2014    on CT     Hx of endoscopy     gastric ulcer    Hyperlipidemia     Osteoarthritis     Osteopenia     PUD (peptic ulcer disease)     nsaid     Syncope     remote, carotid us normal        Past Surgical History:   Procedure Laterality Date    CARPAL TUNNEL RELEASE          COLONOSCOPY  2015    Dr. Cande Rojas, mild diverticulosis    UPPER GASTROINTESTINAL ENDOSCOPY  2013    , Gastric Ulcer. Social History     Tobacco Use    Smoking status: Former Smoker     Last attempt to quit: 2008     Years since quittin.8    Smokeless tobacco: Never Used   Substance Use Topics    Alcohol use: No        Family History   Problem Relation Age of Onset    COPD Mother         tobacco abuse,     Cancer Father         lymphoma    Coronary Art Dis Father     Cancer Maternal Aunt         liver cancer.      Other Sister         kidney cyst,FRANZ lung disease    Cancer Daughter 40        Hodkin's (passed on )     Thyroid Disease Daughter 45        Graves     High Blood Pressure Neg Hx     High Cholesterol Neg Hx     Heart Disease Neg Hx        PHYSICAL EXAMINATION:  Vitals:    20 0912   BP: 118/62   Site: Left Upper Arm   Position: Sitting   Cuff Size: Medium Adult   Pulse: 67   SpO2: 97%   Weight: 163 lb (73.9 kg)   Height: 5' 3\" (1.6 m) Component Value Date    ALT 14 06/10/2020    AST 22 06/10/2020     Echo 9/16/20:      CT of chest w/wo contrast 10/11/19:  1. Mild dilation of the proximal aorta involving the sinuses of Valsalva and    sino-tubular junction.  Atherosclerotic calcification of the aortic valve may    indicate underlying stenosis as potential etiology.  Recommend correlation    with echocardiogram.    2. No significant pulmonary findings. 3. Incidental right adrenal adenoma and left renal cyst in the abdomen. Hepatic steatosis is also demonstrated. Echo 9/25/19  Summary   -Normal left ventricle size, wall thickness and systolic function with an   estimated ejection fraction of 55%.   - No regional wall motion abnormalities are seen. E/e\"= 7.12 .   -No regional wall motion abnormalities are noted.   -Indeterminate diastolic function.   -The aortic valve is normal in structure and function. Mild to moderate   aortic regurgitation.   -The aortic root is mildly dilated. 4 cm.   -The ascending aorta is mildly dilated. 4.5 cm.   -IVC size is normal (<2.1cm) and collapses > 50% with respiration consistent   with normal RA pressure (3mmHg). Echo 9/14/18:   Summary   -Normal left ventricle size, wall thickness and systolic function with an   estimated ejection fraction of 55-60%. No regional wall motion abnormalities   are seen.   -Normal diastolic function.   -Mild aortic regurgitation.   -Mild mitral regurgitation.   -Mild tricuspid regurgitation with a estimated RVSP of 30 mmHg.   -Trivial pulmonic regurgitation present.   -The aortic root is dilated, measuring 4.3 cm. Echo 9/8/17:  Technically limited study due to lung interface. Normal left ventricle size, wall thickness and systolic function with an  estimated ejection fraction of 55%. No regional wall motion abnormalities  are seen. There is reversal of E/A inflow velocities across the mitral valve. Trivial mitral regurgitation is present. The aortic root is mildly dilated. Echo 9/15/16:  Summary   Normal left ventricle size, wall thickness and systolic function with an   estimated ejection fraction of 60%. Trivial mitral regurgitation is present. The aortic valve appears possibly bicuspid . Mild aortic regurgitation is present. The aortic root is mildly dilated. 4.1 to 4.3 cm. Trivial tricuspid regurgitation. .   Trivial pulmonic regurgitation present. MCOT 3/31/16:  Short run of atrial tachycardia but patient was asymptomatic  Patient reports occasional, brief skipped beats   Treatment options including medical therapy was discussed with patient. Risks, benefits and alternative of each treatment      CT Abd/Pelvis 10/7/14:  CT chest with IV       HISTORY: Abnormal echocardiogram, aortic root dilatation   Aortic root is dilated measuring 4.5 x 4.3 cm without   intimo-medial flap. Mid aortic arch segment measures 2.7 cm in   diameter and mid descending segment measures 2.4 cm. Pulmonary arterial trunk, main pulmonary arteries and proximal   arterial branches are patent. There is no intrathoracic or   axillary lymphadenopathy. Thyroid gland is normal in size. Trachea and mainstem bronchi are patent. Lungs are clear. 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 Carotid duplex 7/24/14:  Impressions   Right Impression   The right internal carotid artery demonstrates no significant stenosis. Left Impression   The left internal carotid artery appears to have a 1-15% diameter reducing   stenosis based on velocity criteria. Echo 7/24/14:  Normal left ventricle size and systolic function with an estimated. Ejection fraction of 55%. No regional wall motion abnormalities are seen. There is mild concentric left ventricular hypertrophy.  There is reversal of E/A inflow velocities across the mitral valve suggesting impaired left ventricular relaxation. E/e\"= 14. Trace of mitral, pulmonic and tricuspid regurgitation. RVSP 22mmHg. Mildly dilated aortic root and ascending aorta. 4.1-4.2cm. Mild to moderate aortic insufficiency. Normal stress echocardiogram study. Decreased sensitivity d/t failure to reach target heart rate. ASSESSMENT  Dilated aortic root ([address]  ~  7/2014 Echo - mildly dilated AR and asc. aorta (4.1- 4.2 cm)  ~  10/2014 CT of abd and pelvis - AR dilated at 4.5 x 4.3 cm without intimo-medial flap. Mid aortic arch segment measures 2.7 cm and mid descending segment measures 2.4 cm.   ~  2016 to 2019 Echoes - AR mildly dilated between 3.9 and 4.3. Mostly mild AI noted  ~  10/11/19 CT of chest - Sinuses of Valsalva measures 4.5 cm, sinotubular junction measures 4.1 cm, mid ascending aorta measures 3.9 cm. Mild calcification of aortic valve.   ~  9/16/20 Echo -normal LV systolic function, mild to moderate aortic insufficiency, aortic root dilated but stable. Plan > stable. Echo in 1 year. Blood pressure control. Essential hypertension  ~ Controlled on Amlodipine 7.5 mg daily, hctz  ~ Blood pressure 118/62, pulse 67, height 5' 3\" (1.6 m), weight 163 lb (73.9 kg), SpO2 97 %, not currently breastfeeding. Plan> well-controlled. Her blood pressure monitor as well as blood pressures obtained during office visit correlate. Continue current antihypertensive regimen. Hypertriglyceridemia  ~ 5/16/19:   HDL 45 LDL 98  ~ Treated with Fenofibrate 160 mg daily. Plan > stable. Update lipids. Palpitations  ~  hx of skipped beats  ~  3/3016 Event monitor - brief AT which was asymptomatic  ~  Hx of bradycardia - not on BB or Diltiazem      Plan > stable. Per EP. Abnormal ECG    Plan > nonspecific T wave abnormality is a new finding compared to ECG 3 years ago. Recommend Myoview stress test for further cardiac risk assessment. PLAN:  1. Lipid profile. 2.  Echo in 1 year. 3.  Myoview stress test for abnormal ECG with new nonspecific T wave abnormality. 4.  RTC 1 year. Scribe's attestation: This note was scribed in the presence of Nilesh Colindres M.D. by Juaquin Clayton RN     Physician Attestation: The scribe's documentation has been prepared under my direction and personally reviewed by me in its entirety. I confirm that the note above accurately reflects all work, treatment, procedures, and medical decision making performed by me. An  electronic signature was used to authenticate this note. Annel Aviles MD, Cesario Meyer    If you have questions, please do not hesitate to call me. I look forward to following Mitesh Lee along with you.     Sincerely,        Nilesh Colindres MD

## 2020-09-18 DIAGNOSIS — E78.1 HYPERTRIGLYCERIDEMIA: ICD-10-CM

## 2020-09-18 LAB
ALT SERPL-CCNC: 18 U/L (ref 10–40)
AST SERPL-CCNC: 23 U/L (ref 15–37)
CHOLESTEROL, TOTAL: 136 MG/DL (ref 0–199)
HDLC SERPL-MCNC: 45 MG/DL (ref 40–60)
LDL CHOLESTEROL CALCULATED: 72 MG/DL
TOTAL CK: 73 U/L (ref 26–192)
TRIGL SERPL-MCNC: 96 MG/DL (ref 0–150)
VLDLC SERPL CALC-MCNC: 19 MG/DL

## 2020-09-21 RX ORDER — FENOFIBRATE 160 MG/1
TABLET ORAL
Qty: 90 TABLET | Refills: 3 | OUTPATIENT
Start: 2020-09-21

## 2020-09-21 RX ORDER — AMLODIPINE BESYLATE 5 MG/1
TABLET ORAL
Qty: 135 TABLET | Refills: 3 | Status: SHIPPED | OUTPATIENT
Start: 2020-09-21 | End: 2021-11-02 | Stop reason: SDUPTHER

## 2020-09-21 NOTE — TELEPHONE ENCOUNTER
Pt has been informed. Pt states that she had a lipid panel done on 9/18/20. Pt has scheduled an apt on Thursday to talk to Dr. Michael Mckee. Pt would like to know if Dr. Michael Mckee could fill her rx. Please advise.

## 2020-09-22 RX ORDER — FENOFIBRATE 160 MG/1
TABLET ORAL
Qty: 90 TABLET | Refills: 1 | Status: SHIPPED | OUTPATIENT
Start: 2020-09-22 | End: 2021-04-16

## 2020-09-24 ENCOUNTER — TELEMEDICINE (OUTPATIENT)
Dept: FAMILY MEDICINE CLINIC | Age: 70
End: 2020-09-24
Payer: COMMERCIAL

## 2020-09-24 PROCEDURE — 4040F PNEUMOC VAC/ADMIN/RCVD: CPT | Performed by: FAMILY MEDICINE

## 2020-09-24 PROCEDURE — 1123F ACP DISCUSS/DSCN MKR DOCD: CPT | Performed by: FAMILY MEDICINE

## 2020-09-24 PROCEDURE — G8417 CALC BMI ABV UP PARAM F/U: HCPCS | Performed by: FAMILY MEDICINE

## 2020-09-24 PROCEDURE — G8399 PT W/DXA RESULTS DOCUMENT: HCPCS | Performed by: FAMILY MEDICINE

## 2020-09-24 PROCEDURE — 3017F COLORECTAL CA SCREEN DOC REV: CPT | Performed by: FAMILY MEDICINE

## 2020-09-24 PROCEDURE — 1036F TOBACCO NON-USER: CPT | Performed by: FAMILY MEDICINE

## 2020-09-24 PROCEDURE — 1090F PRES/ABSN URINE INCON ASSESS: CPT | Performed by: FAMILY MEDICINE

## 2020-09-24 PROCEDURE — G8427 DOCREV CUR MEDS BY ELIG CLIN: HCPCS | Performed by: FAMILY MEDICINE

## 2020-09-24 PROCEDURE — 99213 OFFICE O/P EST LOW 20 MIN: CPT | Performed by: FAMILY MEDICINE

## 2020-09-24 RX ORDER — AMLODIPINE BESYLATE 2.5 MG/1
2.5 TABLET ORAL DAILY
Qty: 90 TABLET | Refills: 1
Start: 2020-09-24 | End: 2020-12-01

## 2020-09-24 ASSESSMENT — PATIENT HEALTH QUESTIONNAIRE - PHQ9
SUM OF ALL RESPONSES TO PHQ QUESTIONS 1-9: 0
1. LITTLE INTEREST OR PLEASURE IN DOING THINGS: 0
SUM OF ALL RESPONSES TO PHQ9 QUESTIONS 1 & 2: 0
2. FEELING DOWN, DEPRESSED OR HOPELESS: 0
SUM OF ALL RESPONSES TO PHQ QUESTIONS 1-9: 0

## 2020-09-24 ASSESSMENT — ENCOUNTER SYMPTOMS
BLURRED VISION: 0
ORTHOPNEA: 0
SHORTNESS OF BREATH: 0

## 2020-09-24 NOTE — PROGRESS NOTES
Subjective:      Patient ID: Sesar Patino is a 77 y.o. female. Sesar Patino is a 77 y.o. female evaluated via telephone on 9/24/2020. Consent:  She and/or health care decision maker is aware that that she may receive a bill for this telephone service, depending on her insurance coverage, and has provided verbal consent to proceed: Yes      Documentation:  I communicated with the patient and/or health care decision maker about cc . Details of this discussion including any medical advice provided: yes      I affirm this is a Patient Initiated Episode with a Patient who has not had a related appointment within my department in the past 7 days or scheduled within the next 24 hours. Patient identification was verified at the start of the visit: Yes    Total Time: minutes: 11-20 minutes    Note: not billable if this call serves to triage the patient into an appointment for the relevant concern      Lisa Boucher     Hypertension   This is a chronic problem. The current episode started more than 1 year ago. The problem is unchanged. The problem is controlled. Pertinent negatives include no anxiety, blurred vision, chest pain, headaches, malaise/fatigue, neck pain, orthopnea, palpitations, peripheral edema, PND, shortness of breath or sweats. There are no associated agents to hypertension. Risk factors for coronary artery disease include dyslipidemia and sedentary lifestyle. Past treatments include calcium channel blockers and diuretics. The current treatment provides significant improvement. Compliance problems include exercise. There is no history of heart failure or left ventricular hypertrophy. There is no history of chronic renal disease, sleep apnea or a thyroid problem. Hyperlipidemia   This is a chronic problem. The current episode started more than 1 year ago. The problem is controlled.  Recent lipid tests were reviewed and are normal. She has no history of chronic renal disease, diabetes, hypothyroidism, liver disease or obesity. Factors aggravating her hyperlipidemia include thiazides. Pertinent negatives include no chest pain, focal sensory loss, focal weakness, leg pain or shortness of breath. Current antihyperlipidemic treatment includes fibric acid derivatives. The current treatment provides significant improvement of lipids. Other   This is a chronic (thoracic aneurysm ) problem. The current episode started more than 1 year ago. The problem occurs constantly. The problem has been unchanged. Pertinent negatives include no chest pain, headaches or neck pain. Nothing aggravates the symptoms. She has tried nothing for the symptoms. Osteoporosis   \"I'm holding fosamax\" \"I took it for 20 years\"   No bone pain  Takes vit D and Calcium  Exercise: no regularly      Former smoker. Continue to abstained. Review of Systems   Constitutional: Negative for malaise/fatigue. Eyes: Negative for blurred vision. Respiratory: Negative for shortness of breath. Cardiovascular: Negative for chest pain, palpitations, orthopnea and PND. Musculoskeletal: Negative for neck pain. Neurological: Negative for focal weakness and headaches. Objective:   Physical Exam  Vitals signs and nursing note reviewed. Constitutional:       General: She is not in acute distress. HENT:      Head:      Comments: Hearing intact to nml conversation     Neurological:      Mental Status: She is alert and oriented to person, place, and time. Comments: Speech normal     Psychiatric:         Mood and Affect: Mood normal.         Assessment:       Diagnosis Orders   1. Benign essential HTN     2. Hypertriglyceridemia     3. Thoracic aortic aneurysm without rupture (HCC)     4. Age-related osteoporosis without current pathological fracture     5. Former smoker             Plan:      1.  At goal  Continue norvast 7.5 mg po qd   Encourage compliance with medication, life style changes  Fu 6 mo  bw at goal, discussed

## 2020-09-28 ENCOUNTER — HOSPITAL ENCOUNTER (OUTPATIENT)
Dept: NON INVASIVE DIAGNOSTICS | Age: 70
Discharge: HOME | End: 2020-09-28
Payer: MEDICARE

## 2020-09-28 LAB
LV EF: 73 %
LVEF MODALITY: NORMAL

## 2020-09-28 PROCEDURE — 93017 CV STRESS TEST TRACING ONLY: CPT | Performed by: INTERNAL MEDICINE

## 2020-09-28 PROCEDURE — A9502 TC99M TETROFOSMIN: HCPCS | Performed by: INTERNAL MEDICINE

## 2020-09-28 PROCEDURE — 3430000000 HC RX DIAGNOSTIC RADIOPHARMACEUTICAL: Performed by: INTERNAL MEDICINE

## 2020-09-28 PROCEDURE — 78452 HT MUSCLE IMAGE SPECT MULT: CPT | Performed by: INTERNAL MEDICINE

## 2020-09-28 RX ADMIN — TETROFOSMIN 30 MILLICURIE: 1.38 INJECTION, POWDER, LYOPHILIZED, FOR SOLUTION INTRAVENOUS at 10:21

## 2020-09-28 RX ADMIN — TETROFOSMIN 10 MILLICURIE: 1.38 INJECTION, POWDER, LYOPHILIZED, FOR SOLUTION INTRAVENOUS at 08:57

## 2020-09-28 NOTE — PROGRESS NOTES
Patient instructed on [facility] Protocol Stress Test Procedure including possible side effects and adverse reactions. Verbalizes knowledge and understanding and denies having any questions.

## 2020-09-29 ENCOUNTER — TELEPHONE (OUTPATIENT)
Dept: CARDIOLOGY CLINIC | Age: 70
End: 2020-09-29

## 2020-09-29 NOTE — TELEPHONE ENCOUNTER
Reviewed results of nuclear stress test with patient. All questions addressed and answered. Patient verbalized understanding.

## 2020-09-29 NOTE — TELEPHONE ENCOUNTER
----- Message from Bashir Dunlap MD sent at 9/28/2020  5:28 PM EDT -----  Your stress test is normal.  Replied to patient via [address]

## 2020-12-01 RX ORDER — AMLODIPINE BESYLATE 2.5 MG/1
TABLET ORAL
Qty: 90 TABLET | Refills: 3 | Status: SHIPPED | OUTPATIENT
Start: 2020-12-01 | End: 2021-11-02

## 2021-02-19 ENCOUNTER — IMMUNIZATION (OUTPATIENT)
Dept: PRIMARY CARE CLINIC | Age: 71
End: 2021-02-19
Payer: MEDICARE

## 2021-02-19 PROCEDURE — 0011A COVID-19, MODERNA VACCINE 100MCG/0.5ML DOSE: CPT | Performed by: FAMILY MEDICINE

## 2021-02-19 PROCEDURE — 91301 COVID-19, MODERNA VACCINE 100MCG/0.5ML DOSE: CPT | Performed by: FAMILY MEDICINE

## 2021-03-19 ENCOUNTER — IMMUNIZATION (OUTPATIENT)
Dept: PRIMARY CARE CLINIC | Age: 71
End: 2021-03-19
Payer: COMMERCIAL

## 2021-03-19 PROCEDURE — 0012A COVID-19, MODERNA VACCINE 100MCG/0.5ML DOSE: CPT | Performed by: FAMILY MEDICINE

## 2021-03-19 PROCEDURE — 91301 COVID-19, MODERNA VACCINE 100MCG/0.5ML DOSE: CPT | Performed by: FAMILY MEDICINE

## 2021-04-16 RX ORDER — FENOFIBRATE 160 MG/1
TABLET ORAL
Qty: 90 TABLET | Refills: 3 | Status: SHIPPED | OUTPATIENT
Start: 2021-04-16 | End: 2022-03-25

## 2021-05-17 ENCOUNTER — TELEPHONE (OUTPATIENT)
Dept: FAMILY MEDICINE CLINIC | Age: 71
End: 2021-05-17

## 2021-05-20 ENCOUNTER — VIRTUAL VISIT (OUTPATIENT)
Dept: FAMILY MEDICINE CLINIC | Age: 71
End: 2021-05-20
Payer: COMMERCIAL

## 2021-05-20 DIAGNOSIS — M54.50 ACUTE RIGHT-SIDED LOW BACK PAIN WITHOUT SCIATICA: ICD-10-CM

## 2021-05-20 DIAGNOSIS — G89.29 CHRONIC RIGHT-SIDED LOW BACK PAIN WITHOUT SCIATICA: Primary | ICD-10-CM

## 2021-05-20 DIAGNOSIS — M54.50 CHRONIC RIGHT-SIDED LOW BACK PAIN WITHOUT SCIATICA: Primary | ICD-10-CM

## 2021-05-20 PROCEDURE — 99442 PR PHYS/QHP TELEPHONE EVALUATION 11-20 MIN: CPT | Performed by: FAMILY MEDICINE

## 2021-05-20 RX ORDER — TRAMADOL HYDROCHLORIDE 50 MG/1
50 TABLET ORAL EVERY 8 HOURS PRN
Qty: 12 TABLET | Refills: 0 | Status: SHIPPED | OUTPATIENT
Start: 2021-05-20 | End: 2021-05-23

## 2021-05-20 RX ORDER — METHYLPREDNISOLONE 4 MG/1
TABLET ORAL
Qty: 1 KIT | Refills: 0 | Status: SHIPPED | OUTPATIENT
Start: 2021-05-20 | End: 2021-05-26

## 2021-05-20 ASSESSMENT — ENCOUNTER SYMPTOMS
BACK PAIN: 1
BOWEL INCONTINENCE: 0
ABDOMINAL PAIN: 0

## 2021-05-20 ASSESSMENT — PATIENT HEALTH QUESTIONNAIRE - PHQ9
SUM OF ALL RESPONSES TO PHQ QUESTIONS 1-9: 0
SUM OF ALL RESPONSES TO PHQ QUESTIONS 1-9: 0

## 2021-05-20 NOTE — PROGRESS NOTES
Subjective:      Juan Saleh is a 72 y.o. female evaluated via telephone on 5/20/2021. Consent:  She and/or health care decision maker is aware that that she may receive a bill for this telephone service, depending on her insurance coverage, and has provided verbal consent to proceed: Yes      Documentation:  I communicated with the patient and/or health care decision maker about cc. Details of this discussion including any medical advice provided: yes      I affirm this is a Patient Initiated Episode with a Patient who has not had a related appointment within my department in the past 7 days or scheduled within the next 24 hours. Patient identification was verified at the start of the visit: Yes    Total Time: minutes: 11-20 minutes    The visit was conducted pursuant to the emergency declaration under the [address] [place] [address], [address] [place] [address] authority and the [employer] Act. Patient identification was verified, and a caregiver was present when appropriate. The patient was located in a state where the provider was credentialed to provide care. Note: not billable if this call serves to triage the patient into an appointment for the relevant concern      Shirl Rinne, MD       Patient ID: Juan Saleh is a 72 y.o. female. Back Pain  This is a chronic problem. The current episode started more than 1 year ago (acute on chronic for month). The problem occurs constantly. The pain is present in the lumbar spine. Quality: R side  The pain does not radiate (stabbing). The pain is severe. Exacerbated by: standing, getting in and out of the car  Stiffness is present: some times. Pertinent negatives include no abdominal pain, bladder incontinence or bowel incontinence. Risk factors include lack of exercise. She has tried NSAIDs (tylenol) for the symptoms. The treatment provided no relief.        Review of Systems Gastrointestinal: Negative for abdominal pain and bowel incontinence. Genitourinary: Negative for bladder incontinence. Musculoskeletal: Positive for back pain. Objective:   Physical Exam  Vitals and nursing note reviewed. Constitutional:       General: She is not in acute distress. HENT:      Head:      Comments: Hearing intact to nml conversation     Neurological:      Mental Status: She is alert and oriented to person, place, and time. Psychiatric:         Mood and Affect: Mood normal.         Assessment:       Diagnosis Orders   1. Chronic right-sided low back pain without sciatica  XR LUMBAR SPINE (2-3 VIEWS)   2. Acute right-sided low back pain without sciatica  methylPREDNISolone (MEDROL DOSEPACK) 4 MG tablet    traMADol (ULTRAM) 50 MG tablet           Plan:      1. No cauda equina sx   Acute on chronic problem  Tx: medrol and tramadol/tylenol as recommended  Controlled Substances Monitoring: Attestation: The Prescription Monitoring Report for this patient was reviewed today. Milo Johnson MD)  Documentation: No signs of potential drug abuse or diversion identified. Milo Johnson MD)    Get fu XR     Previous MRI in 2010 shoed herniated disc (from L1 to S1)  If sx persist we may need re get a fu MRI  patient agrees and verbalizes understanding    Fu 2 weeks.            Pat Juárez MD

## 2021-05-21 ENCOUNTER — HOSPITAL ENCOUNTER (OUTPATIENT)
Age: 71
Discharge: HOME | End: 2021-05-21
Payer: MEDICARE

## 2021-05-21 ENCOUNTER — HOSPITAL ENCOUNTER (OUTPATIENT)
Dept: GENERAL RADIOLOGY | Age: 71
Discharge: HOME | End: 2021-05-21
Payer: MEDICARE

## 2021-05-21 DIAGNOSIS — G89.29 CHRONIC RIGHT-SIDED LOW BACK PAIN WITHOUT SCIATICA: ICD-10-CM

## 2021-05-21 DIAGNOSIS — M54.50 CHRONIC RIGHT-SIDED LOW BACK PAIN WITHOUT SCIATICA: ICD-10-CM

## 2021-05-21 PROCEDURE — 72100 X-RAY EXAM L-S SPINE 2/3 VWS: CPT

## 2021-08-16 NOTE — PROGRESS NOTES
kidney Oct 2014    by CT followed by urology    Fatty liver Oct 2014    on CT     HTN (hypertension)     Hyperlipidemia     Hypertriglyceridemia     Osteoarthritis     Osteopenia     PUD (peptic ulcer disease)     nsaid     PUD (peptic ulcer disease)     gastric ulcer    Sinus bradycardia     Syncope     remote, carotid us normal        Past Surgical History:   Procedure Laterality Date    CARPAL TUNNEL RELEASE      2004    CATARACT REMOVAL          COLONOSCOPY  2015    Dr. Hazel Sotelo, mild diverticulosis    UPPER GASTROINTESTINAL ENDOSCOPY  2013    , Gastric Ulcer. Social History     Tobacco Use    Smoking status: Former Smoker     Quit date: 2008     Years since quittin.8    Smokeless tobacco: Never Used   Substance Use Topics    Alcohol use: No        Family History   Problem Relation Age of Onset    COPD Mother         tobacco abuse,     Cancer Father         lymphoma    Coronary Art Dis Father     Cancer Maternal Aunt         liver cancer.  Other Sister         kidney cyst,FRANZ lung disease    Cancer Daughter 40        Hodkin's (passed on )     Thyroid Disease Daughter 45        Graves     High Blood Pressure Neg Hx     High Cholesterol Neg Hx     Heart Disease Neg Hx        PHYSICAL EXAMINATION:  Vitals:    21 0955   BP: 118/64   Site: Left Upper Arm   Pulse: 63   SpO2: 98%   Weight: 169 lb 8 oz (76.9 kg)   Height: 5' 3\" (1.6 m)     Estimated body mass index is 30.03 kg/m² as calculated from the following:    Height as of this encounter: 5' 3\" (1.6 m). Weight as of this encounter: 169 lb 8 oz (76.9 kg). Physical Exam  Constitutional:       Appearance: She is well-developed. She is not diaphoretic. HENT:      Head: Normocephalic and atraumatic. Eyes:      General: No scleral icterus. Extraocular Movements: Extraocular movements intact. Conjunctiva/sclera: Conjunctivae normal.   Neck:      Vascular: No JVD. Cardiovascular:      Rate and Rhythm: Normal rate and regular rhythm. Heart sounds: Normal heart sounds. No murmur heard. No friction rub. No gallop. Pulmonary:      Effort: Pulmonary effort is normal. No respiratory distress. Breath sounds: Normal breath sounds. No wheezing or rales. Abdominal:      General: Bowel sounds are normal.      Palpations: Abdomen is soft. Tenderness: There is no abdominal tenderness. Musculoskeletal:         General: Normal range of motion. Cervical back: Normal range of motion and neck supple. Skin:     General: Skin is warm and dry. Findings: No rash. Neurological:      General: No focal deficit present. Mental Status: She is alert and oriented to person, place, and time. Psychiatric:         Mood and Affect: Mood normal.         Behavior: Behavior normal.         Thought Content: Thought content normal.         Judgment: Judgment normal.         I have reviewed all pertinent lab results and diagnostic testing. Lab Results   Component Value Date    CHOL 136 09/18/2020    TRIG 96 09/18/2020    HDL 45 09/18/2020    HDL 44 12/15/2011    LDLCALC 72 09/18/2020     Lab Results   Component Value Date     06/10/2020    K 4.0 06/10/2020     06/10/2020    CO2 25 06/10/2020    GLUCOSE 115 06/10/2020    BUN 15 06/10/2020    CREATININE 0.7 06/10/2020    CALCIUM 10.5 06/10/2020    GFR >60 01/08/2013    GFRAA >60 06/10/2020    GFRAA >60 01/08/2013     Lab Results   Component Value Date    ALT 18 09/18/2020    AST 23 09/18/2020     Echo 9/1/2021:  Summary   Normal left ventricle size, wall thickness, and systolic function with an   estimated ejection fraction of 60-65%. No regional wall motion abnormalities   are seen. Global longitudinal strain: -22% (normal). Diastolic filling parameters suggests grade I diastolic dysfunction . Trivial mitral regurgitation.    Moderate aortic regurgitation directed eccentrically towards the mitral   valve leaflets. The aortic root is mildly dilated measuring 4.3 cm. The ascending aorta is   moderately dilated measuring 4.6 cm. Trivial tricuspid regurgitation with a PASP of 23 mmHg. Mild pulmonic regurgitation. Myoview GXT 9/28/20:  Summary    Non-diagnostic EKG response due to baseline abnormalities.        Normal LV size and systolic function.    Left ventricular ejection fraction of 73 %.    There is normal isotope uptake at stress and rest.    There is no evidence of myocardial ischemia or scar. Echo 9/16/20:   Summary   Normal left ventricle size, wall thickness, and systolic function with an   estimated ejection fraction of 60-65%. No regional wall motion abnormalities are seen. Normal diastolic filling pattern for age. Trivial mitral regurgitation. The aortic root and ascending aorta are dilated at 4.3 cm. The aortic valve appears sclerotic but opens well. Mild to moderate aortic regurgitation. There is mild tricuspid regurgitation with a RVSP estimation of 30-35 mmHg. CT of chest w/wo contrast 10/11/19:  1. Mild dilation of the proximal aorta involving the sinuses of Valsalva and    sino-tubular junction.  Atherosclerotic calcification of the aortic valve may    indicate underlying stenosis as potential etiology.  Recommend correlation    with echocardiogram.    2. No significant pulmonary findings. 3. Incidental right adrenal adenoma and left renal cyst in the abdomen. Hepatic steatosis is also demonstrated. Echo 9/25/19  Summary   -Normal left ventricle size, wall thickness and systolic function with an   estimated ejection fraction of 55%.   - No regional wall motion abnormalities are seen. E/e\"= 7.12 .   -No regional wall motion abnormalities are noted.   -Indeterminate diastolic function.   -The aortic valve is normal in structure and function. Mild to moderate   aortic regurgitation.   -The aortic root is mildly dilated.  4 cm.   -The ascending aorta is mildly dilated. 4.5 cm.   -IVC size is normal (<2.1cm) and collapses > 50% with respiration consistent   with normal RA pressure (3mmHg). Echo 9/14/18:   Summary   -Normal left ventricle size, wall thickness and systolic function with an   estimated ejection fraction of 55-60%. No regional wall motion abnormalities   are seen.   -Normal diastolic function.   -Mild aortic regurgitation.   -Mild mitral regurgitation.   -Mild tricuspid regurgitation with a estimated RVSP of 30 mmHg.   -Trivial pulmonic regurgitation present.   -The aortic root is dilated, measuring 4.3 cm. Echo 9/8/17:  Technically limited study due to lung interface. Normal left ventricle size, wall thickness and systolic function with an  estimated ejection fraction of 55%. No regional wall motion abnormalities  are seen. There is reversal of E/A inflow velocities across the mitral valve. Trivial mitral regurgitation is present. The aortic root is mildly dilated. Echo 9/15/16:  Summary   Normal left ventricle size, wall thickness and systolic function with an   estimated ejection fraction of 60%. Trivial mitral regurgitation is present. The aortic valve appears possibly bicuspid . Mild aortic regurgitation is present. The aortic root is mildly dilated. 4.1 to 4.3 cm. Trivial tricuspid regurgitation. .   Trivial pulmonic regurgitation present. MCOT 3/31/16:  Short run of atrial tachycardia but patient was asymptomatic  Patient reports occasional, brief skipped beats   Treatment options including medical therapy was discussed with patient. Risks, benefits and alternative of each treatment      CT Abd/Pelvis 10/7/14:  CT chest with IV       HISTORY: Abnormal echocardiogram, aortic root dilatation   Aortic root is dilated measuring 4.5 x 4.3 cm without   intimo-medial flap. Mid aortic arch segment measures 2.7 cm in   diameter and mid descending segment measures 2.4 cm.         Pulmonary arterial trunk, main pulmonary arteries and proximal   arterial branches are patent. There is no intrathoracic or   axillary lymphadenopathy. Thyroid gland is normal in size. Trachea and mainstem bronchi are patent. Lungs are clear. 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 Carotid duplex 7/24/14:  Impressions   Right Impression   The right internal carotid artery demonstrates no significant stenosis. Left Impression   The left internal carotid artery appears to have a 1-15% diameter reducing   stenosis based on velocity criteria. Echo 7/24/14:  Normal left ventricle size and systolic function with an estimated. Ejection fraction of 55%. No regional wall motion abnormalities are seen. There is mild concentric left ventricular hypertrophy. There is reversal of E/A inflow velocities across the mitral valve suggesting impaired left ventricular relaxation. E/e\"= 14. Trace of mitral, pulmonic and tricuspid regurgitation. RVSP 22mmHg. Mildly dilated aortic root and ascending aorta. 4.1-4.2cm. Mild to moderate aortic insufficiency. Normal stress echocardiogram study. Decreased sensitivity d/t failure to reach target heart rate. ASSESSMENT  1. Dilated aortic root ([address]  -  7/2014 Echo - mildly dilated AR and asc. aorta (4.1- 4.2 cm)  -  10/2014 CT of abd and pelvis - AR dilated at 4.5 x 4.3 cm without intimo-medial flap. Mid aortic arch segment measures 2.7 cm and mid descending segment measures 2.4 cm.   -   2016 to 2019 Echoes - AR mildly dilated between 3.9 and 4.3. Mostly mild AI noted  -   10/11/19 CT of chest - Sinuses of Valsalva measures 4.5 cm, sinotubular junction measures 4.1 cm, mid ascending aorta measures 3.9 cm. Mild calcification of aortic valve. -   9/16/20 Echo -normal LV systolic function, mild to moderate aortic insufficiency, aortic root dilated at 4.3 but stable. Plan: BP well controlled. CTA chest to correlate with echo.   Echo in 1 year.    2.  Essential hypertension  - on  Amlodipine 7.5 mg daily, HCTZ  -  Blood pressure 118/64, pulse 63, height 5' 3\" (1.6 m), weight 169 lb 8 oz (76.9 kg), SpO2 98 %, not currently breastfeeding.  -  Home blood pressure monitor correlated closely with manual BP at 9/16/20 office visit       Plan : Stable. Continue current medical regimen. 3.  Hypertriglyceridemia  - 9/18/20 --  TC- 136, TG- 96 , HDL-  45,  LDL- 72.  CK- 73. Liver enzymes normal  - on Fenofibrate 160 mg daily. Plan : Stable. Continue current medical regimen. Fasting lipid profile, LFTs, CK.      4.  Palpitations / bradycardia  -   hx of palpitations / skipped beats  -   3/3016 Event monitor - brief AT which was asymptomatic  -   not on BB or Diltiazem d/t hx of bradycardia       Plan : Stable. Continue current medical regimen. 5.  Abnormal ECG  -  9/21/21 ECG-- nonspecific T wave abnormality is a new finding compared to ECG 3 years ago. - 9/28/21 Myoview GXT - EF 73%, normal perfusion, no evidence of ischemia or scar  - on ASA 81 mg daily    Plan: Stress test unremarkable in 2020. PLAN:  1. CTA chest to correlate with echo. 2.  Echo in 1 year. 3.  Fasting lipid profile, LFTs, CK.  4.  RTC in 1 year. Scribe's attestation: This note was scribed in the presence of Fannie Pride M.D. by Radha Hamilton RN    Physician Attestation: The scribe's documentation has been prepared under my direction and personally reviewed by me in its entirety. I confirm that the note above accurately reflects all work, treatment, procedures, and medical decision making performed by me. An  electronic signature was used to authenticate this note. Chema Coleman MD, [facility] [place], [address]

## 2021-09-01 ENCOUNTER — OFFICE VISIT (OUTPATIENT)
Dept: CARDIOLOGY CLINIC | Age: 71
End: 2021-09-01
Payer: MEDICARE

## 2021-09-01 ENCOUNTER — HOSPITAL ENCOUNTER (OUTPATIENT)
Dept: NON INVASIVE DIAGNOSTICS | Age: 71
Discharge: HOME | End: 2021-09-01
Payer: MEDICARE

## 2021-09-01 VITALS
HEART RATE: 63 BPM | WEIGHT: 169.5 LBS | DIASTOLIC BLOOD PRESSURE: 64 MMHG | OXYGEN SATURATION: 98 % | BODY MASS INDEX: 30.03 KG/M2 | SYSTOLIC BLOOD PRESSURE: 118 MMHG | HEIGHT: 63 IN

## 2021-09-01 DIAGNOSIS — I77.810 DILATED AORTIC ROOT (HCC): ICD-10-CM

## 2021-09-01 DIAGNOSIS — I77.810 DILATED AORTIC ROOT (HCC): Primary | ICD-10-CM

## 2021-09-01 DIAGNOSIS — R94.31 ABNORMAL ELECTROCARDIOGRAM (ECG) (EKG): ICD-10-CM

## 2021-09-01 DIAGNOSIS — R00.2 PALPITATIONS: ICD-10-CM

## 2021-09-01 DIAGNOSIS — E78.1 HYPERTRIGLYCERIDEMIA: ICD-10-CM

## 2021-09-01 DIAGNOSIS — R94.31 ABNORMAL EKG: ICD-10-CM

## 2021-09-01 DIAGNOSIS — I10 ESSENTIAL HYPERTENSION: ICD-10-CM

## 2021-09-01 DIAGNOSIS — R00.1 SINUS BRADYCARDIA: Chronic | ICD-10-CM

## 2021-09-01 LAB
LV EF: 63 %
LVEF MODALITY: NORMAL

## 2021-09-01 PROCEDURE — G8427 DOCREV CUR MEDS BY ELIG CLIN: HCPCS | Performed by: INTERNAL MEDICINE

## 2021-09-01 PROCEDURE — 4040F PNEUMOC VAC/ADMIN/RCVD: CPT | Performed by: INTERNAL MEDICINE

## 2021-09-01 PROCEDURE — 1090F PRES/ABSN URINE INCON ASSESS: CPT | Performed by: INTERNAL MEDICINE

## 2021-09-01 PROCEDURE — G8417 CALC BMI ABV UP PARAM F/U: HCPCS | Performed by: INTERNAL MEDICINE

## 2021-09-01 PROCEDURE — 93306 TTE W/DOPPLER COMPLETE: CPT

## 2021-09-01 PROCEDURE — 99214 OFFICE O/P EST MOD 30 MIN: CPT | Performed by: INTERNAL MEDICINE

## 2021-09-01 PROCEDURE — G8399 PT W/DXA RESULTS DOCUMENT: HCPCS | Performed by: INTERNAL MEDICINE

## 2021-09-01 PROCEDURE — 1036F TOBACCO NON-USER: CPT | Performed by: INTERNAL MEDICINE

## 2021-09-01 PROCEDURE — 3017F COLORECTAL CA SCREEN DOC REV: CPT | Performed by: INTERNAL MEDICINE

## 2021-09-01 PROCEDURE — 1123F ACP DISCUSS/DSCN MKR DOCD: CPT | Performed by: INTERNAL MEDICINE

## 2021-09-01 NOTE — PATIENT INSTRUCTIONS
Update CT scan chest to evaluate your aortic root    Update your fasting blood work at [facility] can do this with Dr. Roro Rodriguez if you would like    Follow up in 1 year with an echo

## 2021-09-07 ASSESSMENT — PATIENT HEALTH QUESTIONNAIRE - PHQ9
SUM OF ALL RESPONSES TO PHQ9 QUESTIONS 1 & 2: 1
1. LITTLE INTEREST OR PLEASURE IN DOING THINGS: 0
2. FEELING DOWN, DEPRESSED OR HOPELESS: 1
SUM OF ALL RESPONSES TO PHQ QUESTIONS 1-9: 1

## 2021-09-07 ASSESSMENT — LIFESTYLE VARIABLES
AUDIT TOTAL SCORE: INCOMPLETE
HOW OFTEN DO YOU HAVE A DRINK CONTAINING ALCOHOL: 0
HOW OFTEN DO YOU HAVE A DRINK CONTAINING ALCOHOL: NEVER
AUDIT-C TOTAL SCORE: INCOMPLETE

## 2021-09-14 ENCOUNTER — OFFICE VISIT (OUTPATIENT)
Dept: FAMILY MEDICINE CLINIC | Age: 71
End: 2021-09-14
Payer: MEDICARE

## 2021-09-14 VITALS
DIASTOLIC BLOOD PRESSURE: 80 MMHG | BODY MASS INDEX: 29.79 KG/M2 | OXYGEN SATURATION: 98 % | TEMPERATURE: 98.1 F | SYSTOLIC BLOOD PRESSURE: 118 MMHG | RESPIRATION RATE: 16 BRPM | WEIGHT: 168.1 LBS | HEIGHT: 63 IN | HEART RATE: 85 BPM

## 2021-09-14 DIAGNOSIS — Z00.00 ROUTINE GENERAL MEDICAL EXAMINATION AT A HEALTH CARE FACILITY: ICD-10-CM

## 2021-09-14 DIAGNOSIS — Z00.00 MEDICARE ANNUAL WELLNESS VISIT, SUBSEQUENT: Primary | ICD-10-CM

## 2021-09-14 PROCEDURE — 36415 COLL VENOUS BLD VENIPUNCTURE: CPT | Performed by: FAMILY MEDICINE

## 2021-09-14 PROCEDURE — 4040F PNEUMOC VAC/ADMIN/RCVD: CPT | Performed by: FAMILY MEDICINE

## 2021-09-14 PROCEDURE — 1123F ACP DISCUSS/DSCN MKR DOCD: CPT | Performed by: FAMILY MEDICINE

## 2021-09-14 PROCEDURE — 3017F COLORECTAL CA SCREEN DOC REV: CPT | Performed by: FAMILY MEDICINE

## 2021-09-14 PROCEDURE — G0439 PPPS, SUBSEQ VISIT: HCPCS | Performed by: FAMILY MEDICINE

## 2021-09-14 SDOH — ECONOMIC STABILITY: FOOD INSECURITY: WITHIN THE PAST 12 MONTHS, THE FOOD YOU BOUGHT JUST DIDN'T LAST AND YOU DIDN'T HAVE MONEY TO GET MORE.: NEVER TRUE

## 2021-09-14 SDOH — ECONOMIC STABILITY: FOOD INSECURITY: WITHIN THE PAST 12 MONTHS, YOU WORRIED THAT YOUR FOOD WOULD RUN OUT BEFORE YOU GOT MONEY TO BUY MORE.: NEVER TRUE

## 2021-09-14 ASSESSMENT — SOCIAL DETERMINANTS OF HEALTH (SDOH): HOW HARD IS IT FOR YOU TO PAY FOR THE VERY BASICS LIKE FOOD, HOUSING, MEDICAL CARE, AND HEATING?: NOT HARD AT ALL

## 2021-09-14 NOTE — PATIENT INSTRUCTIONS
[facility] Emergency Department    [address] [place] [address]    Phone:  [phone]    Fax:  [phone]                                       Cheryl Coles   MRN: [id number]    Department:  [facility] Emergency Department   Date of Visit:  1/23/2017           Patient Information     Date Of Birth          1982        Your diagnoses for this visit were:     Complete miscarriage        You were seen by Sj Lindquist MD.      Follow-up Information     Follow up with your ob. Call in 1 day.        Discharge Instructions         Completed Spontaneous Miscarriage  Today's exams show your pregnancy has ended suddenly. We understand that this is emotionally difficult. There is little we can say to change the way you feel. But understand that miscarriages are common.  About 1 or 2 out of every 10 pregnancies end this way. Some end even before you know you are pregnant. This happens for a number of reasons, and usually we never figure out why. It s important you know that it is not your fault. It didn t happen because you did anything wrong.  Having sex or exercising does not cause a miscarriage. These activities are usually safe unless you have pain or bleeding or your doctor tells you to stop. Even minor falls won t cause a miscarriage. Miscarriages happen because things were not developing as they were supposed to.  It appears that your miscarriage is complete. All tissue from the pregnancy should have passed out of your uterus. If parts of the pregnancy tissue remains in the uterus, you will probably have more cramping and bleeding. The bleeding can be light spotting or like a period, but it is usually not heavy. You may also pass some tissue.  After you have recovered, you should still be able to get pregnant again. But before trying, talk with your health care provider.  Home care  Follow these tips to take of  yourself at home:    You can go back to your  Personalized Preventive Plan for [place] - 9/14/2021  Medicare offers a range of preventive health benefits. Some of the tests and screenings are paid in full while other may be subject to a deductible, co-insurance, and/or copay. Some of these benefits include a comprehensive review of your medical history including lifestyle, illnesses that may run in your family, and various assessments and screenings as appropriate. After reviewing your medical record and screening and assessments performed today your provider may have ordered immunizations, labs, imaging, and/or referrals for you. A list of these orders (if applicable) as well as your Preventive Care list are included within your After Visit Summary for your review. Other Preventive Recommendations:    · A preventive eye exam performed by an eye specialist is recommended every 1-2 years to screen for glaucoma; cataracts, macular degeneration, and other eye disorders. · A preventive dental visit is recommended every 6 months. · Try to get at least 150 minutes of exercise per week or 10,000 steps per day on a pedometer . · Order or download the FREE \"Exercise & Physical Activity: Your Everyday Guide\" from [employer]. Call 9-962.965.7518 or search [employer] online. · You need 2007-9338 mg of calcium and 6971-6350 IU of vitamin D per day. It is possible to meet your calcium requirement with diet alone, but a vitamin D supplement is usually necessary to meet this goal.  · When exposed to the sun, use a sunscreen that protects against both UVA and UVB radiation with an SPF of 30 or greater. Reapply every 2 to 3 hours or after sweating, drying off with a towel, or swimming. · Always wear a seat belt when traveling in a car. Always wear a helmet when riding a bicycle or motorcycle. normal activities if you don t have heavy bleeding or pain.    You may have some cramping and bleeding, but it shouldn t be severe.  Until the bleeding stops completely and to prevent infection:    Don t have sex until your health care provider says it s OK    Use sanitary napkins instead of tampons.    Don t douche.  Having a miscarriage can be very difficult emotionally. It is natural to feel sadness or grief. It may help to talk about your feelings with family and friends, or with a counselor.  Follow-up care  Make an appointment to see your health care provider in 1 to 2 weeks for a checkup.  If cramping and bleeding return and continue for more than a few days, call your health care provider or return here for an exam. To prevent infection in the uterus, your provider might need to take out any tissue that remains. Or you may be given medication to take at home to help your body expel the rest of the tissue.  Note: If you had an ultrasound, a radiologist will review it. You will be told of any new findings that may affect your care.  Call 911  Call 911 if you have:    Severe pain and very heavy bleeding    Severe lightheadedness, passing out, or fainting    Rapid heart rate    Difficulty breathing    Confusion or difficulty waking up  When to seek medical advice  Call your health care provider right away if any of these occur:    Heavy bleeding. This means soaking 1 new pad an hour over 3 hours.    Bleeding that doesn t stop after 10 days    Foul-smelling vaginal discharge    Fever of 100.4 F (38 C) or higher, or as told by your health care provider    Pain in your lower belly (abdomen) that gets worse    Weakness or dizziness       7052-9836 [employer]. [address] [place] [address], [place] [address]. All rights reserved. This information is not intended as a substitute for professional medical care. Always follow your healthcare professional's instructions.      Discharge Instructions  Vaginal  Bleeding in Pregnancy    Bleeding in early pregnancy can be a sign of a miscarriage in process or an abnormal pregnancy, but often is innocent and the pregnancy will continue normally. We may do blood pregnancy tests and ultrasound to try to determine what is causing the bleeding in your case, but sometimes we can't tell and need to follow you with time, more blood tests, and another ultrasound.     Return to the Emergency Department if:    You have severe abdominal or pelvic pain.    You faint, or feel lightheaded or dizzy.     Your bleeding is gets much worse, and is heavier than a heavy period or if you pass any blood clots larger than a quarter.    You pass any tissue--solid material that doesn't appear smooth and even like a blood clot. If you pass tissue, save it (even if you have to pull it out of the toilet) and put it in a plastic bag or jar and bring it in.      You have a fever of 100.5 degrees or higher.  If no pregnancy could be seen:    It may be that everything is normal and it is just too early to see the pregnancy, but you could have an ectopic pregnancy, which is a pregnancy in an abnormal location, such as in the tube. An ectopic pregnancy can cause severe internal bleeding or death.    You need to be seen by your regular doctor, or an OB/GYN doctor, in 48-72 hours for a repeat blood pregnancy test.    You should not be alone, in case you suddenly become very sick.     You should not have sex or put anything in your vagina.     If a pregnancy was seen in your uterus:    If a heartbeat could be seen, the chance of miscarriage is much lower.    You need to see your regular doctor, or an OB/GYN doctor, within 2-3 days.    You should not have sex or put anything in your vagina.     Facts about miscarriage: We hope you don't have a miscarriage, but if you do, here are important things to know:    Early miscarriage is very common, and having one miscarriage doesn't mean you will have problems with  another pregnancy.    Nothing you did caused it. Taking medicine, drinking alcohol, having sex, exercising, or falling down won't cause a miscarriage.     If you were given a prescription for medicine here today, be sure to read all of the information (including the package insert) that comes with your prescription.  This will include important information about the medicine, its side effects, and any warnings that you need to know about.  The pharmacist who fills the prescription can provide more information and answer questions you may have about the medicine.  If you have questions or concerns that the pharmacist cannot address, please call or return to the Emergency Department.     Opioid Medication Information    Pain medications are among the most commonly prescribed medicines, so we are including this information for all our patients. If you did not receive pain medication or get a prescription for pain medicine, you can ignore it.     You may have been given a prescription for an opioid (narcotic) pain medicine and/or have received a pain medicine while here in the Emergency Department. These medicines can make you drowsy or impaired. You must not drive, operate dangerous equipment, or engage in any other dangerous activities while taking these medications. If you drive while taking these medications, you could be arrested for DUI, or driving under the influence. Do not drink any alcohol while you are taking these medications.     Opioid pain medications can cause addiction. If you have a history of chemical dependency of any type, you are at a higher risk of becoming addicted to pain medications.  Only take these prescribed medications to treat your pain when all other options have been tried. Take it for as short a time and as few doses as possible. Store your pain pills in a secure place, as they are frequently stolen and provide a dangerous opportunity for children or visitors in your house to start  abusing these powerful medications. We will not replace any lost or stolen medicine.  As soon as your pain is better, you should flush all your remaining medication.     Many prescription pain medications contain Tylenol  (acetaminophen), including Vicodin , Tylenol #3 , Norco , Lortab , and Percocet .  You should not take any extra pills of Tylenol  if you are using these prescription medications or you can get very sick.  Do not ever take more than 3000 mg of acetaminophen in any 24 hour period.    All opioids tend to cause constipation. Drink plenty of water and eat foods that have a lot of fiber, such as fruits, vegetables, prune juice, apple juice and high fiber cereal.  Take a laxative if you don t move your bowels at least every other day. Miralax , Milk of Magnesia, Colace , or Senna  can be used to keep you regular.      Remember that you can always come back to the Emergency Department if you are not able to see your regular doctor in the amount of time listed above, if you get any new symptoms, or if there is anything that worries you.          24 Hour Appointment Hotline       To make an appointment at any [facility], call [phone] ([phone]). If you don't have a family doctor or clinic, we will help you find one. [place] clinics are conveniently located to serve the needs of you and your family.             Review of your medicines      Notice     You have not been prescribed any medications.            Procedures and tests performed during your visit     Basic metabolic panel    CBC with platelets differential    US OB < 14 Weeks w Transvaginal      Orders Needing Specimen Collection     None      Pending Results     Date and Time Order Name Status Description    1/23/2017 2340 US OB < 14 Weeks w Transvaginal Preliminary             Pending Culture Results     No orders found for last 2 day(s).       Test Results from your hospital stay           1/24/2017 12:02 AM - Interface,  Flexilab Results      Component Results     Component Value Ref Range & Units Status    WBC 11.9 (H) 4.0 - 11.0 10e9/L Final    RBC Count 4.65 3.8 - 5.2 10e12/L Final    Hemoglobin 13.1 11.7 - 15.7 g/dL Final    Hematocrit 38.9 35.0 - 47.0 % Final    MCV 84 78 - 100 fl Final    MCH 28.2 26.5 - 33.0 pg Final    MCHC 33.7 31.5 - 36.5 g/dL Final    RDW 12.9 10.0 - 15.0 % Final    Platelet Count 342 150 - 450 10e9/L Final    Diff Method Automated Method  Final    % Neutrophils 66.3 % Final    % Lymphocytes 25.7 % Final    % Monocytes 5.8 % Final    % Eosinophils 1.6 % Final    % Basophils 0.3 % Final    % Immature Granulocytes 0.3 % Final    Nucleated RBCs 0 0 /100 Final    Absolute Neutrophil 7.9 1.6 - 8.3 10e9/L Final    Absolute Lymphocytes 3.1 0.8 - 5.3 10e9/L Final    Absolute Monocytes 0.7 0.0 - 1.3 10e9/L Final    Absolute Eosinophils 0.2 0.0 - 0.7 10e9/L Final    Absolute Basophils 0.0 0.0 - 0.2 10e9/L Final    Abs Immature Granulocytes 0.0 0 - 0.4 10e9/L Final    Absolute Nucleated RBC 0.0  Final         1/24/2017 12:16 AM - Interface, Flexilab Results      Component Results     Component Value Ref Range & Units Status    Sodium 139 133 - 144 mmol/L Final    Potassium 3.7 3.4 - 5.3 mmol/L Final    Chloride 108 94 - 109 mmol/L Final    Carbon Dioxide 22 20 - 32 mmol/L Final    Anion Gap 9 3 - 14 mmol/L Final    Glucose 103 (H) 70 - 99 mg/dL Final    Urea Nitrogen 9 7 - 30 mg/dL Final    Creatinine 0.46 (L) 0.52 - 1.04 mg/dL Final    GFR Estimate >90  Non  GFR Calc   >60 mL/min/1.7m2 Final    GFR Estimate If Black >90   GFR Calc   >60 mL/min/1.7m2 Final    Calcium 8.8 8.5 - 10.1 mg/dL Final         1/24/2017 12:52 AM - Interface, Radiant Ib      Narrative     US OB <14 WEEKS WITH TRANSVAGINAL SINGLE  1/24/2017 12:37 AM      HISTORY: Pregnant with vaginal bleeding and passing clots.     COMPARISON: 1/20/2017.    FINDINGS: Transabdominal and transvaginal imaging  performed.  Transvaginal imaging performed to better visualize the endometrium and  adnexa. There is no intrauterine gestational sac. The previous live  intrauterine pregnancy is no longer present. The endometrium is mildly  heterogeneous and measures up to 1 cm in thickness. The ovaries appear  normal. No adnexal mass. No free pelvic fluid.        Impression     IMPRESSION: No evidence of intrauterine pregnancy. There has been an  interval miscarriage since the prior exam.                Clinical Quality Measure: Blood Pressure Screening     Your blood pressure was checked while you were in the emergency department today. The last reading we obtained was  BP: 100/69 mmHg . Please read the guidelines below about what these numbers mean and what you should do about them.  If your systolic blood pressure (the top number) is less than 120 and your diastolic blood pressure (the bottom number) is less than 80, then your blood pressure is normal. There is nothing more that you need to do about it.  If your systolic blood pressure (the top number) is 120-139 or your diastolic blood pressure (the bottom number) is 80-89, your blood pressure may be higher than it should be. You should have your blood pressure rechecked within a year by a primary care provider.  If your systolic blood pressure (the top number) is 140 or greater or your diastolic blood pressure (the bottom number) is 90 or greater, you may have high blood pressure. High blood pressure is treatable, but if left untreated over time it can put you at risk for heart attack, stroke, or kidney failure. You should have your blood pressure rechecked by a primary care provider within the next 4 weeks.  If your provider in the emergency department today gave you specific instructions to follow-up with your doctor or provider even sooner than that, you should follow that instruction and not wait for up to 4 weeks for your follow-up visit.        Thank you for choosing  "[facility]       Thank you for choosing [facility] for your care. Our goal is always to provide you with excellent care. Hearing back from our patients is one way we can continue to improve our services. Please take a few minutes to complete the written survey that you may receive in the mail after you visit with us. Thank you!        [facility] Information     [employer] lets you send messages to your doctor, view your test results, renew your prescriptions, schedule appointments and more. To sign up, go to [email or web address] . Click on \"Log in\" on the left side of the screen, which will take you to the Welcome page. Then click on \"Sign up Now\" on the right side of the page.     You will be asked to enter the access code listed below, as well as some personal information. Please follow the directions to create your username and password.     Your access code is: [email or web address]  Expires: 2017  9:00 AM     Your access code will  in 90 days. If you need help or a new code, please call your [facility] clinic or [phone].        Care EveryWhere ID     This is your Care EveryWhere ID. This could be used by other organizations to access your [facility] medical records  [id number]        After Visit Summary       This is your record. Keep this with you and show to your community pharmacist(s) and doctor(s) at your next visit.                  "

## 2021-09-14 NOTE — PROGRESS NOTES
Medicare Annual Wellness Visit  Name: Nikunj Yip Date: 2021   MRN: [id number] Sex: Female   Age: 72 y.o. Ethnicity: Non- / Non    : 1950 Race: White (non-)      Talita Linda is here for Medicare AWV    Screenings for behavioral, psychosocial and functional/safety risks, and cognitive dysfunction are all negative except as indicated below. These results, as well as other patient data from the [address] form, are documented in Flowsheets linked to this Encounter. Allergies   Allergen Reactions    Nsaids Other (See Comments)     PUD       Prior to Visit Medications    Medication Sig Taking? Authorizing Provider   hydroCHLOROthiazide (HYDRODIURIL) 25 MG tablet TAKE 1 TABLET DAILY Yes Milo Burch MD   fenofibrate (TRIGLIDE) 160 MG tablet TAKE 1 TABLET DAILY Yes Ree Barthel, MD   amLODIPine (NORVASC) 2.5 MG tablet TAKE 1 TABLET (2.5 MG) DAILY WITH A 5 MG TABLET TO MAKE 7.5 MG DAILY. Yes Milo Burch MD   amLODIPine (NORVASC) 5 MG tablet TAKE ONE AND ONE-HALF TABLETS DAILY Yes Milo Burch MD   Vitamin D (CHOLECALCIFEROL) 25 MCG (1000 UT) TABS tablet Take by mouth daily  Yes Historical Provider, MD   aspirin 81 MG EC tablet Take 81 mg by mouth daily  Yes Historical Provider, MD   Multiple Vitamins-Minerals (THERAPEUTIC MULTIVITAMIN-MINERALS) tablet Take 1 tablet by mouth daily.  Yes Historical Provider, MD       Past Medical History:   Diagnosis Date    Abnormal EKG     non specific T w abnl    Aorta aneurysm [facility]) Oct 2014    echo and CT mild ao dilatation root/arch     Cataract     Cyst of left kidney Oct 2014    by CT followed by urology    Fatty liver Oct 2014    on CT     HTN (hypertension)     Hyperlipidemia     Hypertriglyceridemia     Osteoarthritis     Osteopenia     PUD (peptic ulcer disease)     nsaid     PUD (peptic ulcer disease)     gastric ulcer    Sinus bradycardia     Syncope     remote, carotid us normal        Past Surgical History:   Procedure Laterality Date    CARPAL TUNNEL RELEASE      2004    CATARACT REMOVAL      2020    COLONOSCOPY  12/16/2015    Dr. Susan Bryant, mild diverticulosis    UPPER GASTROINTESTINAL ENDOSCOPY  5/9/2013    , Gastric Ulcer. Family History   Problem Relation Age of Onset    COPD Mother         tobacco abuse,     Cancer Father         lymphoma    Coronary Art Dis Father     Cancer Maternal Aunt         liver cancer.  Other Sister         kidney cyst,FRANZ lung disease    Cancer Daughter 40        Hodkin's (passed on Jan 012)     Thyroid Disease Daughter 45        Graves     High Blood Pressure Neg Hx     High Cholesterol Neg Hx     Heart Disease Neg Hx        CareTeam (Including outside providers/suppliers regularly involved in providing care):   Patient Care Team:  Kellen Medina MD as PCP - General (Family Medicine)  Kellen Medina MD as PCP - [facility] Empaneled Provider  Nataly Colindres MD as Consulting Physician (Cardiology)    Wt Readings from Last 3 Encounters:   09/14/21 168 lb 1.6 oz (76.2 kg)   09/01/21 169 lb 8 oz (76.9 kg)   09/16/20 163 lb (73.9 kg)     Vitals:    09/14/21 0831   BP: 118/80   Pulse: 85   Resp: 16   Temp: 98.1 °F (36.7 °C)   TempSrc: Temporal   SpO2: 98%   Weight: 168 lb 1.6 oz (76.2 kg)   Height: 5' 3\" (1.6 m)     Body mass index is 29.78 kg/m². Based upon direct observation of the patient, evaluation of cognition reveals recent and remote memory intact.     General Appearance: alert and oriented to person, place and time, well developed and well- nourished, in no acute distress  Skin: warm and dry, no rash or erythema  Head: normocephalic and atraumatic  Eyes: pupils equal, round, and reactive to light, extraocular eye movements intact, conjunctivae normal  ENT: tympanic membrane, external ear and ear canal normal bilaterally, nose without deformity, nasal mucosa and turbinates normal without polyps  Neck: supple and non-tender without mass, no thyromegaly or thyroid nodules, no cervical lymphadenopathy  Pulmonary/Chest: clear to auscultation bilaterally- no wheezes, rales or rhonchi, normal air movement, no respiratory distress  Cardiovascular: normal rate, regular rhythm, normal S1 and S2, no murmurs, rubs, clicks, or gallops, distal pulses intact, no carotid bruits  Abdomen: soft, non-tender, non-distended, normal bowel sounds, no masses or organomegaly  Extremities: no cyanosis, clubbing or edema  Musculoskeletal: normal range of motion, no joint swelling, deformity or tenderness  Neurologic: reflexes normal and symmetric, no cranial nerve deficit, gait, coordination and speech normal    Patient's complete Health Risk Assessment and screening values have been reviewed and are found in Flowsheets. The following problems were reviewed today and where indicated follow up appointments were made and/or referrals ordered. Positive Risk Factor Screenings with Interventions:          General Health and ACP:  General  In general, how would you say your health is?: Fair  In the past 7 days, have you experienced any of the following?  New or Increased Pain, New or Increased Fatigue, Loneliness, Social Isolation, Stress or Anger?: (!) Stress  Do you get the social and emotional support that you need?: Yes  Do you have a Living Will?: Yes  Advance Directives     Power of  Living Will ACP-Advance Directive ACP-Power of     Not on File [place] on 05/22/19 Filed [address] [facility] [place] Risk Interventions:  · Pain issues: patient declines any further evaluation/treatment for this issue        Personalized Preventive Plan   Current Health Maintenance Status  Immunization History   Administered Date(s) Administered    COVID-19, Moderna, PHILIPPE, 100mcg/0.5mL 02/19/2021, 03/19/2021    Influenza Vaccine, unspecified formulation 10/25/2006, 09/03/2009, 11/01/2016    Influenza Virus Vaccine 10/03/2013, 09/22/2014, 10/27/2015    Pneumococcal Conjugate 13-valent (Weabmop97) 05/14/2018    Pneumococcal Polysaccharide (Fryavlbst23) 05/02/2017    Td, unspecified formulation 11/20/2008    Tdap (Boostrix, Adacel) 05/14/2018    Zoster Live (Zostavax) 11/21/2015        Health Maintenance   Topic Date Due    Annual Wellness Visit (AWV)  Never done    Potassium monitoring  06/10/2021    Creatinine monitoring  06/10/2021    Flu vaccine (1) 09/01/2021    Shingles Vaccine (2 of 3) 09/07/2029 (Originally 1/16/2016)    Breast cancer screen  08/21/2022    Diabetes screen  06/10/2023    Lipid screen  09/18/2025    DTaP/Tdap/Td vaccine (2 - Td or Tdap) 05/14/2028    Colon cancer screen colonoscopy  06/29/2030    DEXA (modify frequency per FRAX score)  Completed    Pneumococcal 65+ years Vaccine  Completed    COVID-19 Vaccine  Completed    Hepatitis C screen  Completed    Hepatitis A vaccine  Aged Out    Hepatitis B vaccine  Aged Out    Hib vaccine  Aged Out    Meningococcal (ACWY) vaccine  Aged Out     Recommendations for [employer] Due: see orders and patient instructions/AVS.  . Recommended screening schedule for the next 5-10 years is provided to the patient in written form: see Patient Vivien Duatre was seen today for medicare awv. Diagnoses and all orders for this visit:    Medicare annual wellness visit, subsequent  -     Comprehensive Metabolic Panel  -     Lipid Panel           Anxious mood :   Discussed medical tx options, she will call if sx worsen.    (we may consider Cymbalta)

## 2021-09-15 LAB
A/G RATIO: 1.7 (ref 1.1–2.2)
ALBUMIN SERPL-MCNC: 4.8 G/DL (ref 3.4–5)
ALP BLD-CCNC: 54 U/L (ref 40–129)
ALT SERPL-CCNC: 20 U/L (ref 10–40)
ANION GAP SERPL CALCULATED.3IONS-SCNC: 17 MMOL/L (ref 3–16)
AST SERPL-CCNC: 27 U/L (ref 15–37)
BILIRUB SERPL-MCNC: 0.3 MG/DL (ref 0–1)
BUN BLDV-MCNC: 19 MG/DL (ref 7–20)
CALCIUM SERPL-MCNC: 11.3 MG/DL (ref 8.3–10.6)
CHLORIDE BLD-SCNC: 100 MMOL/L (ref 99–110)
CHOLESTEROL, TOTAL: 156 MG/DL (ref 0–199)
CO2: 23 MMOL/L (ref 21–32)
CREAT SERPL-MCNC: 0.9 MG/DL (ref 0.6–1.2)
GFR AFRICAN AMERICAN: >60
GFR NON-AFRICAN AMERICAN: >60
GLOBULIN: 2.8 G/DL
GLUCOSE BLD-MCNC: 132 MG/DL (ref 70–99)
HDLC SERPL-MCNC: 48 MG/DL (ref 40–60)
LDL CHOLESTEROL CALCULATED: 88 MG/DL
POTASSIUM SERPL-SCNC: 4.2 MMOL/L (ref 3.5–5.1)
SODIUM BLD-SCNC: 140 MMOL/L (ref 136–145)
TOTAL PROTEIN: 7.6 G/DL (ref 6.4–8.2)
TRIGL SERPL-MCNC: 102 MG/DL (ref 0–150)
VLDLC SERPL CALC-MCNC: 20 MG/DL

## 2021-10-04 ENCOUNTER — HOSPITAL ENCOUNTER (OUTPATIENT)
Dept: CT IMAGING | Age: 71
Discharge: HOME | End: 2021-10-04
Payer: MEDICARE

## 2021-10-04 DIAGNOSIS — I77.810 DILATED AORTIC ROOT (HCC): ICD-10-CM

## 2021-10-04 PROCEDURE — 6360000004 HC RX CONTRAST MEDICATION: Performed by: INTERNAL MEDICINE

## 2021-10-04 PROCEDURE — 71270 CT THORAX DX C-/C+: CPT

## 2021-10-04 RX ADMIN — IOPAMIDOL 75 ML: 755 INJECTION, SOLUTION INTRAVENOUS at 18:07

## 2021-10-07 ENCOUNTER — PATIENT MESSAGE (OUTPATIENT)
Dept: CARDIOLOGY CLINIC | Age: 71
End: 2021-10-07

## 2021-10-07 NOTE — TELEPHONE ENCOUNTER
From: Chema Page  To: Shon Alegria MD  Sent: 10/7/2021 7:23 AM EDT  Subject: Test Results Question    Haven't heard from the office since my CT scan on Monday Oct. 4th.

## 2021-10-11 ENCOUNTER — TELEPHONE (OUTPATIENT)
Dept: CARDIOLOGY CLINIC | Age: 71
End: 2021-10-11

## 2021-10-11 DIAGNOSIS — I77.810 DILATED AORTIC ROOT (HCC): Primary | ICD-10-CM

## 2021-10-11 NOTE — TELEPHONE ENCOUNTER
Per Dr. Ana M Cordero - aortic root 4.2 cm., ascending thoracic aorta 4.1 cm.  No significant change from 2019 CT scan.  Would repeat CT of chest w/wo contrast in one year. Patient will need BMP within 30 days before 2022 CT of chest.     Discussed results of CT of chest and compared findings to 2019 CT scan as well as recent Echo. All patient's questions addressed and answered. Recommend that CT of the chest be done in one year before next office visit with echo same day so that all testing can be discussed at the visit. Patient verbalized understanding of all information discussed.

## 2021-10-28 DIAGNOSIS — I77.810 DILATED AORTIC ROOT (HCC): Primary | ICD-10-CM

## 2021-11-02 DIAGNOSIS — R00.2 PALPITATIONS: ICD-10-CM

## 2021-11-02 DIAGNOSIS — R93.1 ABNORMAL ECHOCARDIOGRAM: ICD-10-CM

## 2021-11-02 DIAGNOSIS — I10 ESSENTIAL HYPERTENSION: ICD-10-CM

## 2021-11-02 DIAGNOSIS — R00.1 SINUS BRADYCARDIA: Chronic | ICD-10-CM

## 2021-11-02 RX ORDER — AMLODIPINE BESYLATE 2.5 MG/1
TABLET ORAL
Qty: 90 TABLET | Refills: 3 | Status: SHIPPED | OUTPATIENT
Start: 2021-11-02 | End: 2022-06-20

## 2021-11-02 RX ORDER — AMLODIPINE BESYLATE 5 MG/1
TABLET ORAL
Qty: 90 TABLET | Refills: 3 | Status: SHIPPED | OUTPATIENT
Start: 2021-11-02 | End: 2022-06-20

## 2021-11-02 NOTE — TELEPHONE ENCOUNTER
Received refill request for Amlodipine from [address]     Last ov: 2021 Kylee Wei    Last EK2020    Last Refill: 2020 #90 w/ 3 refills    Next appointment: 2022 Kylee Wei (recall)

## 2022-01-24 DIAGNOSIS — R00.2 PALPITATIONS: ICD-10-CM

## 2022-01-24 DIAGNOSIS — R00.1 SINUS BRADYCARDIA: Chronic | ICD-10-CM

## 2022-01-24 DIAGNOSIS — R93.1 ABNORMAL ECHOCARDIOGRAM: ICD-10-CM

## 2022-01-24 RX ORDER — HYDROCHLOROTHIAZIDE 25 MG/1
TABLET ORAL
Qty: 90 TABLET | Refills: 2 | Status: SHIPPED | OUTPATIENT
Start: 2022-01-24

## 2022-03-25 RX ORDER — FENOFIBRATE 160 MG/1
TABLET ORAL
Qty: 90 TABLET | Refills: 3 | Status: SHIPPED | OUTPATIENT
Start: 2022-03-25 | End: 2022-06-20 | Stop reason: ALTCHOICE

## 2022-05-13 ENCOUNTER — OFFICE VISIT (OUTPATIENT)
Dept: FAMILY MEDICINE CLINIC | Age: 72
End: 2022-05-13
Payer: MEDICARE

## 2022-05-13 VITALS
WEIGHT: 165 LBS | HEART RATE: 81 BPM | RESPIRATION RATE: 16 BRPM | BODY MASS INDEX: 29.23 KG/M2 | DIASTOLIC BLOOD PRESSURE: 64 MMHG | HEIGHT: 63 IN | TEMPERATURE: 98.4 F | SYSTOLIC BLOOD PRESSURE: 112 MMHG | OXYGEN SATURATION: 98 %

## 2022-05-13 DIAGNOSIS — D62 ANEMIA DUE TO ACUTE BLOOD LOSS: ICD-10-CM

## 2022-05-13 DIAGNOSIS — R07.89 CHEST WALL PAIN: ICD-10-CM

## 2022-05-13 DIAGNOSIS — G89.29 CHRONIC FOOT PAIN, RIGHT: ICD-10-CM

## 2022-05-13 DIAGNOSIS — Z09 HOSPITAL DISCHARGE FOLLOW-UP: ICD-10-CM

## 2022-05-13 DIAGNOSIS — I77.810 DILATED AORTIC ROOT (HCC): ICD-10-CM

## 2022-05-13 DIAGNOSIS — M79.671 CHRONIC FOOT PAIN, RIGHT: ICD-10-CM

## 2022-05-13 DIAGNOSIS — K25.0 ACUTE GASTRIC ULCER WITH HEMORRHAGE: Primary | ICD-10-CM

## 2022-05-13 PROCEDURE — G8417 CALC BMI ABV UP PARAM F/U: HCPCS | Performed by: FAMILY MEDICINE

## 2022-05-13 PROCEDURE — 4040F PNEUMOC VAC/ADMIN/RCVD: CPT | Performed by: FAMILY MEDICINE

## 2022-05-13 PROCEDURE — 1036F TOBACCO NON-USER: CPT | Performed by: FAMILY MEDICINE

## 2022-05-13 PROCEDURE — G8427 DOCREV CUR MEDS BY ELIG CLIN: HCPCS | Performed by: FAMILY MEDICINE

## 2022-05-13 PROCEDURE — G8399 PT W/DXA RESULTS DOCUMENT: HCPCS | Performed by: FAMILY MEDICINE

## 2022-05-13 PROCEDURE — 1090F PRES/ABSN URINE INCON ASSESS: CPT | Performed by: FAMILY MEDICINE

## 2022-05-13 PROCEDURE — 1111F DSCHRG MED/CURRENT MED MERGE: CPT | Performed by: FAMILY MEDICINE

## 2022-05-13 PROCEDURE — 1123F ACP DISCUSS/DSCN MKR DOCD: CPT | Performed by: FAMILY MEDICINE

## 2022-05-13 PROCEDURE — 3017F COLORECTAL CA SCREEN DOC REV: CPT | Performed by: FAMILY MEDICINE

## 2022-05-13 PROCEDURE — 99214 OFFICE O/P EST MOD 30 MIN: CPT | Performed by: FAMILY MEDICINE

## 2022-05-13 RX ORDER — PANTOPRAZOLE SODIUM 40 MG/1
40 TABLET, DELAYED RELEASE ORAL 2 TIMES DAILY
COMMUNITY
Start: 2022-05-09 | End: 2022-07-14

## 2022-05-13 RX ORDER — ACETAMINOPHEN AND CODEINE PHOSPHATE 300; 30 MG/1; MG/1
1 TABLET ORAL EVERY 4 HOURS PRN
Qty: 18 TABLET | Refills: 0 | Status: SHIPPED | OUTPATIENT
Start: 2022-05-13 | End: 2022-05-16

## 2022-05-13 RX ORDER — FERROUS SULFATE 325(65) MG
325 TABLET ORAL
Qty: 30 TABLET | Refills: 1 | Status: SHIPPED | OUTPATIENT
Start: 2022-05-13 | End: 2022-07-14

## 2022-05-13 ASSESSMENT — PATIENT HEALTH QUESTIONNAIRE - PHQ9
SUM OF ALL RESPONSES TO PHQ QUESTIONS 1-9: 0
SUM OF ALL RESPONSES TO PHQ QUESTIONS 1-9: 0
2. FEELING DOWN, DEPRESSED OR HOPELESS: 0
1. LITTLE INTEREST OR PLEASURE IN DOING THINGS: 0
SUM OF ALL RESPONSES TO PHQ QUESTIONS 1-9: 0
SUM OF ALL RESPONSES TO PHQ QUESTIONS 1-9: 0
SUM OF ALL RESPONSES TO PHQ9 QUESTIONS 1 & 2: 0

## 2022-05-13 NOTE — PROGRESS NOTES
Post-Discharge Transitional Care Follow Up    Gonzalo Zhu   YOB: 1950    Date of Office Visit:  5/13/2022  Date of Hospital Admission: 5/8/22  Date of Hospital Discharge: 5/9/22     Care management risk score Rising risk (score 2-5) and Complex Care (Scores >=6): 4     Non face to face  following discharge, date last encounter closed (first attempt may have been earlier): *No documented post hospital discharge outreach found in the last 14 days     Call initiated 2 business days of discharge: *No response recorded in the last 14 days    ASSESSMENT/PLAN:   Acute gastric ulcer with hemorrhage  Sx resolved, she should continue PPI as recommended  Check fu labs     Anemia due to acute blood loss  Check cbc   Add:   -     ferrous sulfate (IRON 325) 325 (65 Fe) MG tablet; Take 1 tablet by mouth daily (with breakfast), Disp-30 tablet, R-1Normal  -     CBC with Auto Differential  Chest wall pain  Stop or do not take nsaids  Short trial with tylenol #3  Controlled Substances Monitoring: Attestation: The Prescription Monitoring Report for this patient was reviewed today. Maryjane Moody MD)  Documentation: No signs of potential drug abuse or diversion identified. Maryjane Moody MD)      -     acetaminophen-codeine (TYLENOL/CODEINE #3) 300-30 MG per tablet; Take 1 tablet by mouth every 4 hours as needed for Pain for up to 3 days. Intended supply: 3 days. Take lowest dose possible to manage pain, Disp-18 tablet, R-0Normal  Dilated aortic root (HCC)  Chronic foot pain, right  -     Laura Macias MD, Orthopedic Surgery (Foot, Ankle), [facility]      Medical Decision Making: moderate complexity  No follow-ups on file. On this date 5/13/2022 I have spent 40   minutes reviewing previous notes, test results and face to face with the patient discussing the diagnosis and importance of compliance with the treatment plan as well as documenting on the day of the visit.        Subjective: HPI    Inpatient course: Discharge summary reviewed- see chart. Interval history/Current status:      Diagnosis Orders   1. Acute gastric ulcer with hemorrhage   Secondary to nsaid use  Today she denies n/v/melena or hematemesis  Still feels tired. 2. Anemia due to acute blood loss   Was dc home for fu with GI   Does not take iron   ferrous sulfate (IRON 325) 325 (65 Fe) MG tablet    CBC with Auto Differential   3. Chest wall pain   After a fall few weeks ago,   Pain on R chest wall at times severe   More with palpation and movement   tx now tylenol, not helping   Used lidocaine gel did not helped   acetaminophen-codeine (TYLENOL/CODEINE #3) 300-30 MG per tablet   4. Dilated aortic root (HCC)   Unchanged. No severe cp,    5. Chronic foot pain, right   Would like referral    Piter Ramírez MD, Orthopedic Surgery (Foot, Ankle), [facility]     ]    Patient Active Problem List   Diagnosis    Hypertriglyceridemia    Osteopenia    Back pain, chronic    Hyperglycemia    Sinus bradycardia    Heel pain    Abnormal echocardiogram    Occlusion and stenosis of carotid artery    Chest pain    Bilateral knee pain    Chronic back pain    Renal cyst, left    Knee pain    Osteoporosis    Palpitations    Essential hypertension    Dilated aortic root (HCC)    Chronic bilateral low back pain without sciatica       Medication list at time of discharge reviewed: Yes    Medications marked \"taking\" at this time  Outpatient Medications Marked as Taking for the 5/13/22 encounter (Office Visit) with Dimas Bonilla MD   Medication Sig Dispense Refill    Acetaminophen (TYLENOL 8 HOUR PO) Take by mouth      acetaminophen-codeine (TYLENOL/CODEINE #3) 300-30 MG per tablet Take 1 tablet by mouth every 4 hours as needed for Pain for up to 3 days. Intended supply: 3 days.  Take lowest dose possible to manage pain 18 tablet 0    ferrous sulfate (IRON 325) 325 (65 Fe) MG tablet Take 1 tablet by mouth daily (with breakfast) 30 tablet 1    fenofibrate (TRIGLIDE) 160 MG tablet TAKE 1 TABLET DAILY 90 tablet 3    hydroCHLOROthiazide (HYDRODIURIL) 25 MG tablet TAKE 1 TABLET DAILY 90 tablet 2    amLODIPine (NORVASC) 2.5 MG tablet TAKE 1 TABLET (2.5 MG) DAILY WITH A 5 MG TABLET TO MAKE 7.5 MG DAILY. 90 tablet 3    amLODIPine (NORVASC) 5 MG tablet Take one tablet daily (along with a 2.5mg tablet) 90 tablet 3    Vitamin D (CHOLECALCIFEROL) 25 MCG (1000 UT) TABS tablet Take by mouth daily       Multiple Vitamins-Minerals (THERAPEUTIC MULTIVITAMIN-MINERALS) tablet Take 1 tablet by mouth daily. Medications patient taking as of now reconciled against medications ordered at time of hospital discharge: Yes    Review of Systems   Constitutional: Positive for fatigue. Negative for activity change, appetite change, fever and unexpected weight change. HENT: Negative for sinus pressure, sore throat, trouble swallowing and voice change. Eyes: Negative for pain and redness. Respiratory: Negative for chest tightness, shortness of breath and wheezing. Cardiovascular: Negative for chest pain, palpitations and leg swelling. Gastrointestinal: Negative for abdominal distention, abdominal pain, constipation, diarrhea and nausea. Musculoskeletal: Negative for arthralgias, myalgias and neck pain. Skin: Positive for pallor. Negative for rash. Neurological: Negative for dizziness, weakness and light-headedness. Hematological: Negative for adenopathy. Psychiatric/Behavioral: Negative for agitation, behavioral problems, decreased concentration, dysphoric mood and self-injury. The patient is not nervous/anxious. Objective:    /64   Pulse 81   Temp 98.4 °F (36.9 °C) (Tympanic)   Resp 16   Ht 5' 3\" (1.6 m)   Wt 165 lb (74.8 kg)   SpO2 98%   Breastfeeding No   BMI 29.23 kg/m²   Physical Exam  Vitals and nursing note reviewed. Constitutional:       General: She is not in acute distress. Alfie Clay MD

## 2022-05-16 ASSESSMENT — ENCOUNTER SYMPTOMS
EYE REDNESS: 0
SINUS PRESSURE: 0
TROUBLE SWALLOWING: 0
WHEEZING: 0
DIARRHEA: 0
ABDOMINAL PAIN: 0
SHORTNESS OF BREATH: 0
NAUSEA: 0
CHEST TIGHTNESS: 0
ABDOMINAL DISTENTION: 0
SORE THROAT: 0
EYE PAIN: 0
VOICE CHANGE: 0
CONSTIPATION: 0

## 2022-05-27 LAB
BASOPHILS ABSOLUTE: 0 K/UL (ref 0–0.2)
BASOPHILS RELATIVE PERCENT: 0.7 %
EOSINOPHILS ABSOLUTE: 0.2 K/UL (ref 0–0.6)
EOSINOPHILS RELATIVE PERCENT: 4.8 %
HCT VFR BLD CALC: 29.1 % (ref 36–48)
HEMOGLOBIN: 9.4 G/DL (ref 12–16)
LYMPHOCYTES ABSOLUTE: 1.9 K/UL (ref 1–5.1)
LYMPHOCYTES RELATIVE PERCENT: 40.4 %
MCH RBC QN AUTO: 28.9 PG (ref 26–34)
MCHC RBC AUTO-ENTMCNC: 32.3 G/DL (ref 31–36)
MCV RBC AUTO: 89.4 FL (ref 80–100)
MONOCYTES ABSOLUTE: 0.4 K/UL (ref 0–1.3)
MONOCYTES RELATIVE PERCENT: 8.3 %
NEUTROPHILS ABSOLUTE: 2.2 K/UL (ref 1.7–7.7)
NEUTROPHILS RELATIVE PERCENT: 45.8 %
PDW BLD-RTO: 15.5 % (ref 12.4–15.4)
PLATELET # BLD: 322 K/UL (ref 135–450)
PMV BLD AUTO: 8.4 FL (ref 5–10.5)
RBC # BLD: 3.25 M/UL (ref 4–5.2)
WBC # BLD: 4.8 K/UL (ref 4–11)

## 2022-06-11 SDOH — HEALTH STABILITY: PHYSICAL HEALTH: ON AVERAGE, HOW MANY MINUTES DO YOU ENGAGE IN EXERCISE AT THIS LEVEL?: 30 MIN

## 2022-06-11 SDOH — HEALTH STABILITY: PHYSICAL HEALTH: ON AVERAGE, HOW MANY DAYS PER WEEK DO YOU ENGAGE IN MODERATE TO STRENUOUS EXERCISE (LIKE A BRISK WALK)?: 4 DAYS

## 2022-06-14 ENCOUNTER — OFFICE VISIT (OUTPATIENT)
Dept: ORTHOPEDIC SURGERY | Age: 72
End: 2022-06-14

## 2022-06-14 VITALS — BODY MASS INDEX: 29.77 KG/M2 | HEIGHT: 63 IN | WEIGHT: 168 LBS

## 2022-06-14 DIAGNOSIS — R22.41 ANKLE MASS, RIGHT: Primary | ICD-10-CM

## 2022-06-14 DIAGNOSIS — M25.571 ACUTE RIGHT ANKLE PAIN: ICD-10-CM

## 2022-06-14 PROCEDURE — G8417 CALC BMI ABV UP PARAM F/U: HCPCS | Performed by: ORTHOPAEDIC SURGERY

## 2022-06-14 PROCEDURE — G8427 DOCREV CUR MEDS BY ELIG CLIN: HCPCS | Performed by: ORTHOPAEDIC SURGERY

## 2022-06-14 PROCEDURE — 99203 OFFICE O/P NEW LOW 30 MIN: CPT | Performed by: ORTHOPAEDIC SURGERY

## 2022-06-14 PROCEDURE — 1090F PRES/ABSN URINE INCON ASSESS: CPT | Performed by: ORTHOPAEDIC SURGERY

## 2022-06-14 NOTE — PROGRESS NOTES
CHIEF COMPLAINT: Right lateral ankle pain/ mass (Lipoma)    HISTORY:  Ms. Jd Rojas 80 y.o.  female presents today for consultation request from Kevin An MD for evaluation of right lateral ankle pain which started to worsen 4 months ago and get larger in size.  She is complaining of achy, tender, intermittent pain. Pain is increase with standing and walking and shoe wear. Pain is achy with first few steps, dull achy pain by the end of the day. She rates her pain a 5/10 VAS. No radiation and no numbness and tingling sensation. No other complaint. Denies injury or trauma. Denies smoking. Past Medical History:   Diagnosis Date    Abnormal EKG     non specific T w abnl    Aorta aneurysm [facility]) Oct 2014    echo and CT mild ao dilatation root/arch     Cataract     Cyst of left kidney Oct 2014    by CT followed by urology    Fatty liver Oct 2014    on CT     HTN (hypertension)     Hyperlipidemia     Hypertriglyceridemia     Osteoarthritis     Osteopenia     PUD (peptic ulcer disease)     nsaid     PUD (peptic ulcer disease)     gastric ulcer    Sinus bradycardia     Syncope     remote, carotid us normal        Past Surgical History:   Procedure Laterality Date    CARPAL TUNNEL RELEASE          CATARACT REMOVAL          COLONOSCOPY  2015    Dr. Patrick Stephenson, mild diverticulosis    UPPER GASTROINTESTINAL ENDOSCOPY  2013    , Gastric Ulcer.         Social History     Socioeconomic History    Marital status:      Spouse name: Not on file    Number of children: Not on file    Years of education: Not on file    Highest education level: Not on file   Occupational History    Not on file   Tobacco Use    Smoking status: Former Smoker     Packs/day: 0.25     Years: 25.00     Pack years: 6.25     Quit date: 2008     Years since quittin.6    Smokeless tobacco: Never Used   Vaping Use    Vaping Use: Never used   Substance and Sexual Activity    Alcohol use: No    Drug use: Not on file    Sexual activity: Not on file   Other Topics Concern    Not on file   Social History Narrative    Social History     Marital Status: ,   Children: 2,   Employment Status: employed part-time,   Occupation: book keeper    Patient Lives at: home,   Support System: excellent    Caffeine per Day: 3    Seatbelt Use: 100 % of the time    Alcohol Use: occasionally    Drug Use: none    Tobacco Usage:prior smoker    Cigarettes-Year Started: 1970,   Cigarettes-Year Quit: 2008,   Cigarettes-Years smoked- 38,   Passive Smoke Exposure: yes    Sexually Active: Yes                 Social Determinants of Health     Financial Resource Strain: Low Risk     Difficulty of Paying Living Expenses: Not hard at all   Food Insecurity: No Food Insecurity    Worried About Running Out of Food in the Last Year: Never true    [place] of Food in the Last Year: Never true   Transportation Needs:     Lack of Transportation (Medical): Not on file    Lack of Transportation (Non-Medical):  Not on file   Physical Activity: Insufficiently Active    Days of Exercise per Week: 4 days    Minutes of Exercise per Session: 30 min   Stress:     Feeling of Stress : Not on file   Social Connections:     Frequency of Communication with Friends and Family: Not on file    Frequency of Social Gatherings with Friends and Family: Not on file    Attends Rastafarian Services: Not on file    Active Member of Clubs or Organizations: Not on file    Attends Club or Organization Meetings: Not on file    Marital Status: Not on file   Intimate Partner Violence: Not At Risk    Fear of Current or Ex-Partner: No    Emotionally Abused: No    Physically Abused: No    Sexually Abused: No   Housing Stability:     Unable to Pay for Housing in the Last Year: Not on file    Number of [place] in the Last Year: Not on file    Unstable Housing in the Last Year: Not on file       Family History   Problem Relation Age of Onset    COPD Mother         tobacco abuse,     Cancer Father         lymphoma    Coronary Art Dis Father     Cancer Maternal Aunt         liver cancer.  Other Sister         kidney cyst,FRANZ lung disease    Cancer Daughter 40        Hodkin's (passed on Jan 012)     Thyroid Disease Daughter 45        Graves     High Blood Pressure Neg Hx     High Cholesterol Neg Hx     Heart Disease Neg Hx        Current Outpatient Medications on File Prior to Visit   Medication Sig Dispense Refill    Acetaminophen (TYLENOL 8 HOUR PO) Take by mouth      pantoprazole (PROTONIX) 40 MG tablet Take 40 mg by mouth in the morning and at bedtime       ferrous sulfate (IRON 325) 325 (65 Fe) MG tablet Take 1 tablet by mouth daily (with breakfast) 30 tablet 1    fenofibrate (TRIGLIDE) 160 MG tablet TAKE 1 TABLET DAILY 90 tablet 3    hydroCHLOROthiazide (HYDRODIURIL) 25 MG tablet TAKE 1 TABLET DAILY 90 tablet 2    amLODIPine (NORVASC) 2.5 MG tablet TAKE 1 TABLET (2.5 MG) DAILY WITH A 5 MG TABLET TO MAKE 7.5 MG DAILY. 90 tablet 3    amLODIPine (NORVASC) 5 MG tablet Take one tablet daily (along with a 2.5mg tablet) 90 tablet 3    Vitamin D (CHOLECALCIFEROL) 25 MCG (1000 UT) TABS tablet Take by mouth daily       Multiple Vitamins-Minerals (THERAPEUTIC MULTIVITAMIN-MINERALS) tablet Take 1 tablet by mouth daily. No current facility-administered medications on file prior to visit. Pertinent items are noted in HPI  Review of systems reviewed from Patient History Form and available in the patient's chart under the Media tab. No change noted. PHYSICAL EXAMINATION:  Ms. Tawanda Jones is a very pleasant 80 y.o.  female who presents today in no acute distress, awake, alert, and oriented. She is well dressed, nourished and  groomed. Patient with normal affect. Height is  5' 3\" (1.6 m), weight is 168 lb (76.2 kg), Body mass index is 29.76 kg/m². Resting respiratory rate is 16.     Examination of the gait, showed that the patient walks heel-toe with minimal limp.  Examination of both ankles showing dorsiflexion to about 10 degrees bilaterally, which increased with knee flexion. She has intact sensation and good pedal pulses.  She has good strength in all four planes, including eversion, and has mild tenderness on deep palpation over the right lateral ankle, with prominent masslike swelling, compared to the other side.  There is a small nontender lateral left ankle mass. The ankles are stable to drawer test bilaterally, equally.            IMAGING:  Xray's were reviewed, 3 views of the right ankle taken in office today, and showed no acute fracture. There is right ankle soft tissue swelling. IMPRESSION: Right lateral ankle pain/ mass (Lipoma). PLAN:  I discussed with the patient the treatment options including both surgical and non-surgical treatment, and that my recommendation would be surgical excision given the amount of symptoms. I discussed the risks and benefits of surgery with the patient, including but not limited to infection, bleeding, pain, injury to nerves or blood vessels failure of the surgery and need for additional surgery. All the patient's questions were answered. We discussed an expected post-operative course. She  is understanding of this and wishes to proceed. She will call to schedule surgery. Her  has stage IV lung cancer so she needs to check with the familys schedule prior to scheduling surgery. Thank you very much for the kind consultation and allowing me to participate in this patient's care. I will continue to keep you apprised of her progress.          Thais Duncan MD

## 2022-06-14 NOTE — LETTER
[address] [facility]  Surgery Scheduling Form:      DEMOGRAPHICS:                                                                                                                  Patient Name:  Deyvi Dolan  Patient :  1950   Patient SS#:      Patient Phone:  [phone] (home)  Alt. Patient Phone:    Patient Address:  [place]   [address]    PCP:  Amanda Cool MD    Insurance ID Number:  Payor/Plan Subscr  Sex Relation Sub. Ins. ID Effective Group Num   1. [address] [facility] 1950 Female Self [id number]                                    [id number]        DIAGNOSIS & PROCEDURE:                                                                                                Diagnosis:   Right ankle mass R22.41  Operation:  Right lateral ankle mass excision [id number]  [facility]  [facility]      [facility] INFORMATION:                                                                                         .    Requested Date:   22  OR Time: 9:00                       Patient Arrival Time:  7:00  OR Time Required:  45  Minutes  Anesthesia:  General  Equipment:  none  Mini C-Arm:  No   Standard C-Arm:  No  Status:  Outpatient  PAT Required:  Yes    Comments:          Kevin Zelaya MD     22         Pre Operative Physician Prophylaxis Orders - SCIP Protocols      Patient: Deyvi Dolan  :    1950    Procedure: 22 Right lateral ankle mass excision    COVID:   HEIGHT:  Ht Readings from Last 1 Encounters:   22 5' 3\" (1.6 m)                      WEIGHT:  Wt Readings from Last 1 Encounters:   22 168 lb (76.2 kg)         History & Physical:  By Surgeon    Pre-Operative Antibiotic Order:     []  ----  No Antibiotic Ordered       [x]  ----  Give the following Antibiotic within 1 hour prior to start time:                                                      Cefazolin 2g IV <119.9kg (703AFQ) OR 3g if >120kg (264lbs)       or      Clindamycin 900 mg IV (over 1 hour) if patient is allergic to           PENICILLINS or CAPHALOSPORIN       VTE prophylaxis [ ] Thigh hi  TEDS  [ ]  Knee Hi TEDS [ ] Foot Pumps [ ] Compression Devices      Physician Signature:     Date: 6/14/22  Time: 2:31 PM

## 2022-06-15 ENCOUNTER — TELEPHONE (OUTPATIENT)
Dept: ORTHOPEDIC SURGERY | Age: 72
End: 2022-06-15

## 2022-06-15 NOTE — TELEPHONE ENCOUNTER
Patient wanted to let you know her levels as of 5-27-22. Hemoglobin 9.4, hematocrit 29.1 and RBC 3.25. She is taking iron. Ok to have surgery on 6-23?   R- lateral ankle mass excision

## 2022-06-15 NOTE — TELEPHONE ENCOUNTER
Auth: NPR  Date: 06/23/22 thru 09/21/22  Reference # [id number]  Spoke with: Online  Type of SX: Outpatient  Location: [facility]  CPT: 71227   DX: R22.41  SX area: Rt leg  Insurance: [facility] Medicare

## 2022-06-16 NOTE — PROGRESS NOTES
[address]    2022    Amina Madsen (:  1950) is a 80 y.o. female is here for follow up and management of HTN and dyslipidemia. Referring Provider: Alejo Ortiz MD    HISTORY:  Ms. Eula Louis has a history of dilated aortic root, hyperlipidemia and HTN. She is a former smoker (30 year history). Her parents had heart disease. She has seen Dr. Cheryl Martinez for palpitations. 3/2016 MCOT showed short run of AT, but was asymptomatic, skipped beats were associated with SR. She also has a history of bradycardia and is not on BB or Diltiazem. 10/2021 CT of the chest showed evidence of coronary artery calcification and atherosclerosis of aortic arch and descending aorta.       Today, she denies significant chest discomfort, shortness of breath, light headedness, dizziness or palpitations. She did have GI bleed with ulcer, thought to be secondary to NSAIDs, but has improved. REVIEW OF SYSTEMS:  A complete review of systems has been reviewed and updated today and is negative except as noted in the history of present illness. Prior to Visit Medications    Medication Sig Taking? Authorizing Provider   rosuvastatin (CRESTOR) 10 MG tablet Take 1 tablet by mouth daily Yes Thi Stokes MD   Acetaminophen (TYLENOL 8 HOUR PO) Take by mouth Yes Historical Provider, MD   pantoprazole (PROTONIX) 40 MG tablet Take 40 mg by mouth in the morning and at bedtime  Yes Historical Provider, MD   ferrous sulfate (IRON 325) 325 (65 Fe) MG tablet Take 1 tablet by mouth daily (with breakfast) Yes Alejo Ortiz MD   hydroCHLOROthiazide (HYDRODIURIL) 25 MG tablet TAKE 1 TABLET DAILY Yes Thi Stokes MD   Vitamin D (CHOLECALCIFEROL) 25 MCG (1000 UT) TABS tablet Take by mouth daily  Yes Historical Provider, MD   Multiple Vitamins-Minerals (THERAPEUTIC MULTIVITAMIN-MINERALS) tablet Take 1 tablet by mouth daily.  Yes Historical Provider, MD        Allergies   Allergen Reactions    Nsaids Other (See Comments)     PUD       Past Medical History:   Diagnosis Date    Abnormal EKG     non specific T w abnl    Aorta aneurysm [facility]) Oct 2014    echo and CT mild ao dilatation root/arch     Cataract     Cyst of left kidney Oct 2014    by CT followed by urology    Fatty liver Oct 2014    on CT     HTN (hypertension)     Hyperlipidemia     Hypertriglyceridemia     Osteoarthritis     Osteopenia     PUD (peptic ulcer disease)     nsaid     PUD (peptic ulcer disease)     gastric ulcer    Sinus bradycardia     Syncope     remote, carotid us normal        Past Surgical History:   Procedure Laterality Date    CARPAL TUNNEL RELEASE      , release of right thumb    CATARACT REMOVAL          COLONOSCOPY  2015    Dr. Dasha Gamino, mild diverticulosis    LEG BIOPSY EXCISION Right 2022    RIGHT LATERAL ANKLE MASS EXCISION performed by Randy Gonzales MD at [address]  2013    , Gastric Ulcer. Social History     Tobacco Use    Smoking status: Former Smoker     Packs/day: 0.25     Years: 25.00     Pack years: 6.25     Quit date: 2008     Years since quittin.6    Smokeless tobacco: Never Used   Substance Use Topics    Alcohol use: No        Family History   Problem Relation Age of Onset    COPD Mother         tobacco abuse,     Cancer Father         lymphoma    Coronary Art Dis Father     Cancer Maternal Aunt         liver cancer.      Other Sister         kidney cyst,FRANZ lung disease    Cancer Daughter 40        Hodkin's (passed on )     Thyroid Disease Daughter 45        Graves     High Blood Pressure Neg Hx     High Cholesterol Neg Hx     Heart Disease Neg Hx        PHYSICAL EXAMINATION:  Vitals:    22 1003   BP: 120/80   Site: Left Upper Arm   Position: Sitting   Cuff Size: Large Adult   Pulse: 86   SpO2: 100%   Weight: 167 lb (75.8 kg)   Height: 5' 3\" (1.6 m)     Estimated body mass index is 29.58 kg/m² as calculated from the following:    Height as of this encounter: 5' 3\" (1.6 m). Weight as of this encounter: 167 lb (75.8 kg). Physical Exam  Constitutional:       Appearance: She is well-developed. She is not diaphoretic. HENT:      Head: Normocephalic and atraumatic. Eyes:      General: No scleral icterus. Extraocular Movements: Extraocular movements intact. Conjunctiva/sclera: Conjunctivae normal.   Neck:      Vascular: No JVD. Cardiovascular:      Rate and Rhythm: Normal rate and regular rhythm. Heart sounds: Normal heart sounds. No murmur heard. No friction rub. No gallop. Pulmonary:      Effort: Pulmonary effort is normal. No respiratory distress. Breath sounds: Normal breath sounds. No wheezing or rales. Abdominal:      General: Bowel sounds are normal.      Palpations: Abdomen is soft. Tenderness: There is no abdominal tenderness. Musculoskeletal:         General: Normal range of motion. Cervical back: Normal range of motion and neck supple. Skin:     General: Skin is warm and dry. Findings: No rash. Neurological:      General: No focal deficit present. Mental Status: She is alert and oriented to person, place, and time. Psychiatric:         Mood and Affect: Mood normal.         Behavior: Behavior normal.         Thought Content: Thought content normal.         Judgment: Judgment normal.         I have reviewed all pertinent lab results and diagnostic testing.         Lab Results   Component Value Date    CHOL 156 09/14/2021    TRIG 102 09/14/2021    HDL 48 09/14/2021    HDL 44 12/15/2011    LDLCALC 88 09/14/2021     Lab Results   Component Value Date     05/09/2022    K 3.5 05/09/2022     05/09/2022    CO2 27 05/09/2022    GLUCOSE 119 05/09/2022    BUN 36 05/09/2022    CREATININE 0.76 05/09/2022    CALCIUM 9.0 05/09/2022    GFR 84 05/09/2022    GFRAA >60 09/14/2021    GFRAA >60 01/08/2013     Lab Results   Component Value Date    ALT 20 09/14/2021    AST 27 09/14/2021     Echo 6/20/22  Summary   Left ventricular systolic function is normal with ejection fraction   estimated at 60-65 %. Suspect false tendon left ventricle. There is mild left ventricular hypertrophy. There is reversal of E/A inflow velocities across the mitral valve. The aortic root is moderately dilated. The ascending aorta is moderately dilated. Calc noted on aortic valve cusp. Mild aortic regurgitation is present. Mild mitral, tricuspid and pulmonic regurgitation. CT chest w/wo contrast 10/4/21:  No change in the appearance of the aorta.  Incidental note is made of a 2   vessel aortic arch.  The aortic root measures 4.2 cm, the ascending thoracic   aorta measures 4.1 cm, the proximal aortic arch measures 3.5 cm, the distal   aortic arch measures 3.0 cm and the hiatus measures 2.4 cm.  Mild   atherosclerosis involves the aortic arch and descending thoracic aorta. Mediastinum: Coronary artery calcifications are a marker of atherosclerosis. There are no enlarged thoracic lymph nodes.  The main pulmonary artery is   dilated due to pulmonary hypertension. Echo 9/1/2021:  Summary   Normal left ventricle size, wall thickness, and systolic function with an   estimated ejection fraction of 60-65%. No regional wall motion abnormalities   are seen. Global longitudinal strain: -22% (normal). Diastolic filling parameters suggests grade I diastolic dysfunction . Trivial mitral regurgitation. Moderate aortic regurgitation directed eccentrically towards the mitral   valve leaflets. The aortic root is mildly dilated measuring 4.3 cm. The ascending aorta is   moderately dilated measuring 4.6 cm. Trivial tricuspid regurgitation with a PASP of 23 mmHg. Mild pulmonic regurgitation.     Myoview GXT 9/28/20:  Summary    Non-diagnostic EKG response due to baseline abnormalities.        Normal LV size and systolic function.    Left ventricular ejection fraction of 73 %.    There is normal isotope uptake at stress and rest.    There is no evidence of myocardial ischemia or scar. Echo 9/16/20:   Summary   Normal left ventricle size, wall thickness, and systolic function with an   estimated ejection fraction of 60-65%. No regional wall motion abnormalities are seen. Normal diastolic filling pattern for age. Trivial mitral regurgitation. The aortic root and ascending aorta are dilated at 4.3 cm. The aortic valve appears sclerotic but opens well. Mild to moderate aortic regurgitation. There is mild tricuspid regurgitation with a RVSP estimation of 30-35 mmHg. CT of chest w/wo contrast 10/11/19:  1. Mild dilation of the proximal aorta involving the sinuses of Valsalva and    sino-tubular junction.  Atherosclerotic calcification of the aortic valve may    indicate underlying stenosis as potential etiology.  Recommend correlation    with echocardiogram.    2. No significant pulmonary findings. 3. Incidental right adrenal adenoma and left renal cyst in the abdomen. Hepatic steatosis is also demonstrated. Echo 9/25/19  Summary   -Normal left ventricle size, wall thickness and systolic function with an   estimated ejection fraction of 55%.   - No regional wall motion abnormalities are seen. E/e\"= 7.12 .   -No regional wall motion abnormalities are noted.   -Indeterminate diastolic function.   -The aortic valve is normal in structure and function. Mild to moderate   aortic regurgitation.   -The aortic root is mildly dilated. 4 cm.   -The ascending aorta is mildly dilated. 4.5 cm.   -IVC size is normal (<2.1cm) and collapses > 50% with respiration consistent   with normal RA pressure (3mmHg). Echo 9/14/18:   Summary   -Normal left ventricle size, wall thickness and systolic function with an   estimated ejection fraction of 55-60%.  No regional wall motion abnormalities   are seen.   -Normal diastolic function.   -Mild aortic regurgitation.   -Mild mitral regurgitation.   -Mild tricuspid regurgitation with a estimated RVSP of 30 mmHg.   -Trivial pulmonic regurgitation present.   -The aortic root is dilated, measuring 4.3 cm. Echo 9/8/17:  Technically limited study due to lung interface. Normal left ventricle size, wall thickness and systolic function with an  estimated ejection fraction of 55%. No regional wall motion abnormalities  are seen. There is reversal of E/A inflow velocities across the mitral valve. Trivial mitral regurgitation is present. The aortic root is mildly dilated. Echo 9/15/16:  Summary   Normal left ventricle size, wall thickness and systolic function with an   estimated ejection fraction of 60%. Trivial mitral regurgitation is present. The aortic valve appears possibly bicuspid . Mild aortic regurgitation is present. The aortic root is mildly dilated. 4.1 to 4.3 cm. Trivial tricuspid regurgitation. .   Trivial pulmonic regurgitation present. MCOT 3/31/16:  Short run of atrial tachycardia but patient was asymptomatic  Patient reports occasional, brief skipped beats   Treatment options including medical therapy was discussed with patient. Risks, benefits and alternative of each treatment      CT Abd/Pelvis 10/7/14:  CT chest with IV       HISTORY: Abnormal echocardiogram, aortic root dilatation   Aortic root is dilated measuring 4.5 x 4.3 cm without   intimo-medial flap. Mid aortic arch segment measures 2.7 cm in   diameter and mid descending segment measures 2.4 cm. Pulmonary arterial trunk, main pulmonary arteries and proximal   arterial branches are patent. There is no intrathoracic or   axillary lymphadenopathy. Thyroid gland is normal in size. Trachea and mainstem bronchi are patent. Lungs are clear. 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        Carotid duplex 7/24/14:  Impressions   Right Impression   The right internal carotid artery demonstrates no significant stenosis. Left Impression   The left internal carotid artery appears to have a 1-15% diameter reducing   stenosis based on velocity criteria. Echo 7/24/14:  Normal left ventricle size and systolic function with an estimated. Ejection fraction of 55%. No regional wall motion abnormalities are seen. There is mild concentric left ventricular hypertrophy. There is reversal of E/A inflow velocities across the mitral valve suggesting impaired left ventricular relaxation. E/e\"= 14. Trace of mitral, pulmonic and tricuspid regurgitation. RVSP 22mmHg. Mildly dilated aortic root and ascending aorta. 4.1-4.2cm. Mild to moderate aortic insufficiency. Normal stress echocardiogram study. Decreased sensitivity d/t failure to reach target heart rate. ASSESSMENT  1. Dilated aortic root ([address]  -  7/2014 Echo - mildly dilated AR and asc. aorta (4.1- 4.2 cm)  -  10/2014 CT of abd and pelvis - AR dilated at 4.5 x 4.3 cm without intimo-medial flap. Mid aortic arch segment measures 2.7 cm and mid descending segment measures 2.4 cm.   -   2016 to 2019 Echoes - AR mildly dilated between 3.9 and 4.3. Mostly mild AI noted  -   10/11/19 CT of chest - Sinuses of Valsalva measures 4.5 cm, sinotubular junction measures 4.1 cm, mid ascending aorta measures 3.9 cm. Mild calcification of aortic valve. -   9/16/20 Echo -normal LV systolic function, mild to moderate aortic insufficiency, aortic root dilated at 4.3 but stable. - 10/4/21 CT chest w/wo contrast - aortic root measures 4.2 cm (unchanged), ascending thoracic aorta measures 4.1 cm, proximal aortic arch measures 3.5 cm, distal aortic arch measures 3.0 cm, hiatus measures 2.4 cm.       Plan: CT chest w/wo contrast.    2.  Essential hypertension  - on  Amlodipine 7.5 mg daily, HCTZ 25 mg  -  Blood pressure 120/80, pulse 86, height 5' 3\" (1.6 m), weight 167 lb (75.8 kg), SpO2 100 %, not currently breastfeeding.  -  Home blood pressure monitor correlated closely with manual BP at 9/16/20 office visit     Plan :  Currently off Amlodipine after hypotension with GI bleed. BP readings at home have been very good. Stay off Amlodipine for now. 3.  Hypertriglyceridemia  - 9/14/21 --  TC- 156, TG- 102 , HDL-  48,  LDL- 88. Liver enzymes normal  - on Fenofibrate 160 mg daily. Plan : Given CAD, as evidenced by coronary calcification noted on CT, would recommend adding a statin - Crestor 10 mg daily. Hold Fenofibrate for now until Fasting lipid profile, LFTs, in 3 months, on Crestor. 4.  Palpitations / bradycardia  -   hx of palpitations / skipped beats  -   3/3016 Event monitor - brief AT which was asymptomatic  -   not on BB or Diltiazem d/t hx of bradycardia       Plan : Stable. Continue current medical regimen. 5.  Abnormal ECG  -  9/16/20 ECG-- nonspecific T wave abnormality is a new finding compared to ECG 3 years ago. - 9/28/21 Myoview GXT - EF 73%, normal perfusion, no evidence of ischemia or scar    Plan: Currently not on ASA due to PUD with GI Bleed. 6.  Coronary artery calcification seen on CT scan   - 10/4/21 CT of chest - coronary artery calcification noted. Also atherosclerosis of aortic arch and descending thoracic aorta    Plan :  Consistent with CAD. PLAN:  1.  Stable. Continue current medical regimen. 2.  CT chest w/wo contrast.  3.  Crestor 10 mg daily. Hold Fenofibrate for now until Fasting lipid profile, LFTs, in 3 months, on Crestor. 4.  RTC in 1 year. An  electronic signature was used to authenticate this note. Heather Cash MD, Jill Siddiqie's attestation: This note was scribed in the presence of Idalia Vides M.D. by Bozena Bae RN    Physician Attestation: The scribe's documentation has been prepared under my direction and personally reviewed by me in its entirety.   I confirm that the note above accurately reflects all work, treatment, procedures, and medical decision making performed by me.

## 2022-06-20 ENCOUNTER — HOSPITAL ENCOUNTER (OUTPATIENT)
Dept: NON INVASIVE DIAGNOSTICS | Age: 72
Discharge: HOME | End: 2022-06-20
Payer: MEDICARE

## 2022-06-20 ENCOUNTER — OFFICE VISIT (OUTPATIENT)
Dept: CARDIOLOGY CLINIC | Age: 72
End: 2022-06-20

## 2022-06-20 VITALS
SYSTOLIC BLOOD PRESSURE: 120 MMHG | HEART RATE: 86 BPM | BODY MASS INDEX: 29.59 KG/M2 | DIASTOLIC BLOOD PRESSURE: 80 MMHG | WEIGHT: 167 LBS | OXYGEN SATURATION: 100 % | HEIGHT: 63 IN

## 2022-06-20 DIAGNOSIS — I77.810 DILATED AORTIC ROOT (HCC): ICD-10-CM

## 2022-06-20 DIAGNOSIS — I77.810 DILATED AORTIC ROOT (HCC): Primary | ICD-10-CM

## 2022-06-20 DIAGNOSIS — I10 ESSENTIAL HYPERTENSION: ICD-10-CM

## 2022-06-20 DIAGNOSIS — E78.1 HYPERTRIGLYCERIDEMIA: ICD-10-CM

## 2022-06-20 DIAGNOSIS — R94.31 ABNORMAL ELECTROCARDIOGRAM (ECG) (EKG): ICD-10-CM

## 2022-06-20 DIAGNOSIS — R00.1 SINUS BRADYCARDIA: ICD-10-CM

## 2022-06-20 DIAGNOSIS — I25.10 CORONARY ARTERY CALCIFICATION SEEN ON CT SCAN: ICD-10-CM

## 2022-06-20 DIAGNOSIS — R00.1 SINUS BRADYCARDIA: Chronic | ICD-10-CM

## 2022-06-20 DIAGNOSIS — R00.2 PALPITATIONS: ICD-10-CM

## 2022-06-20 DIAGNOSIS — R93.1 ABNORMAL ECHOCARDIOGRAM: ICD-10-CM

## 2022-06-20 LAB
LV EF: 63 %
LVEF MODALITY: NORMAL

## 2022-06-20 PROCEDURE — 93306 TTE W/DOPPLER COMPLETE: CPT

## 2022-06-20 PROCEDURE — G8427 DOCREV CUR MEDS BY ELIG CLIN: HCPCS | Performed by: INTERNAL MEDICINE

## 2022-06-20 PROCEDURE — G8417 CALC BMI ABV UP PARAM F/U: HCPCS | Performed by: INTERNAL MEDICINE

## 2022-06-20 PROCEDURE — 93000 ELECTROCARDIOGRAM COMPLETE: CPT | Performed by: INTERNAL MEDICINE

## 2022-06-20 PROCEDURE — 1090F PRES/ABSN URINE INCON ASSESS: CPT | Performed by: INTERNAL MEDICINE

## 2022-06-20 PROCEDURE — 1123F ACP DISCUSS/DSCN MKR DOCD: CPT | Performed by: INTERNAL MEDICINE

## 2022-06-20 PROCEDURE — 99214 OFFICE O/P EST MOD 30 MIN: CPT | Performed by: INTERNAL MEDICINE

## 2022-06-20 PROCEDURE — 1036F TOBACCO NON-USER: CPT | Performed by: INTERNAL MEDICINE

## 2022-06-20 PROCEDURE — G8399 PT W/DXA RESULTS DOCUMENT: HCPCS | Performed by: INTERNAL MEDICINE

## 2022-06-20 PROCEDURE — 3017F COLORECTAL CA SCREEN DOC REV: CPT | Performed by: INTERNAL MEDICINE

## 2022-06-20 RX ORDER — ROSUVASTATIN CALCIUM 10 MG/1
10 TABLET, COATED ORAL DAILY
Qty: 30 TABLET | Refills: 1 | Status: SHIPPED | OUTPATIENT
Start: 2022-06-20 | End: 2022-07-19 | Stop reason: SDUPTHER

## 2022-06-20 NOTE — PATIENT INSTRUCTIONS
1.  Okay to remain off Amlodipine if blood pressure remains stable  2. Call our office if systolic blood pressure (top #) is running over 130 (prefer 552-566'D) ; diastolic blood pressure (bottom #) is running over 80 (prefer 70 or lower). We may resume Amlodipine 2.5 mg daily if BP is higher than goals given  3. Okay to proceed with right ankle surgery at low to moderate cardiac risk. 4.  Repeat CT of chest with/without contrast in October 2022  5. Hold Fenofibrate for now, but do not discard right away. 6.  Start Rosuvastatin 10 mg daily (help with bad cholesterol and may reduce triglycerides)  7. BMP in September (within one month before CT scan)  Also check fasting lipids, AST, ALT along with BMP.   8   Work on weight loss and eating healthy diet   9. Okay to stay off aspirin for now due to anemia  10.   Follow with Dr. Nomi Matt in one year

## 2022-06-20 NOTE — PROGRESS NOTES
Name_______________________________________Printed:____________________  Date and time of surgery___6/23/2022_____0900________________Arrival Time:_0730  __masc_____________   1. The instructions given regarding when and if a patient needs to stop oral intake prior to surgery varies. Follow the specific instructions you were given                  _x__Nothing to eat or to drink after Midnight the night before.                   ____Carbo loading or ERAS instructions will be given to select patients-if you have been given those instructions -please do the following                           The evening before your surgery after dinner before midnight drink 40 ounces of gatorade. If you are diabetic use sugar free. The morning of surgery drink 40 ounces of water. This needs to be finished 3 hours prior to your surgery start time. 2. Take the following pills with a small sip of water on the morning of surgery_protonix__________________________________________________                  Do not take blood pressure medications ending in pril or sartan the ann prior to surgery or the morning of surgery_   3. Aspirin, Ibuprofen, Advil, Naproxen, Vitamin E and other Anti-inflammatory products and supplements should be stopped for 5 -7days before surgery or as directed by your physician. 4. Check with your Doctor regarding stopping Plavix, Coumadin,Eliquis, Lovenox,Effient,Pradaxa,Xarelto, Fragmin or other blood thinners and follow their instructions. 5. Do not smoke, and do not drink any alcoholic beverages 24 hours prior to surgery. This includes NA Beer. Refrain from the usage of any recreational drugs. 6. You may brush your teeth and gargle the morning of surgery. DO NOT SWALLOW WATER   7. You MUST make arrangements for a responsible adult to stay on site while you are here and take you home after your surgery. You will not be allowed to leave alone or drive yourself home.   It is strongly suggested someone stay with you the first 24 hrs. Your surgery will be cancelled if you do not have a ride home. 8. A parent/legal guardian must accompany a child scheduled for surgery and plan to stay at the hospital until the child is discharged. Please do not bring other children with you. 9. Please wear simple, loose fitting clothing to the hospital.  Tito Fischer not bring valuables (money, credit cards, checkbooks, etc.) Do not wear any makeup (including no eye makeup) or nail polish on your fingers or toes. 10. DO NOT wear any jewelry or piercings on day of surgery. All body piercing jewelry must be removed. 11. If you have ___dentures, they will be removed before going to the OR; we will provide you a container. If you wear ___contact lenses or _x__glasses, they will be removed; please bring a case for them. 12. Please see your family doctor/pediatrician for a history & physical and/or concerning medications. Bring any test results/reports from your physician's office. PCP__________________Phone___________H&P Appt. Date________             13 If you  have a Living Will and Durable Power of  for Healthcare, please bring in a copy. 15. Notify your Surgeon if you develop any illness between now and surgery  time, cough, cold, fever, sore throat, nausea, vomiting, etc.  Please notify your surgeon if you experience dizziness, shortness of breath or blurred vision between now & the time of your surgery             15. DO NOT shave your operative site 96 hours prior to surgery. For face & neck surgery, men may use an electric razor 48 hours prior to surgery. 16. Shower the night before or morning of surgery using an antibacterial soap or as you have been instructed. 17. To provide excellent care visitors will be limited to one in the room at any given time. 18.  Please bring picture ID and insurance card.              19.  Visit our web site for additional information:  [email or web address]              20.During flu season no children under the age of 15 are permitted in the hospital for the safety of all patients. 21. If you take a long acting insulin in the evening only  take half of your usual  dose the night  before your procedure              22. If you use a c-pap please bring DOS if staying overnight,             23.For your convenience [address] [place] [address] has a pharmacy on site to fill your prescriptions. 24. If you use oxygen and have a portable tank please bring it  with you the DOS             25. Bring a complete list of all your medications with name and dose include any supplements. 26. Other__________________________________________   *Please call pre admission testing if you any further questions   Jim Ontiveros   [address]    [place] [id number]. [place]  [phone]   [address] [place] [address]       VISITOR POLICY(subject to change)    Current policy is 2 visitors per patient. No children. A mask is required. Visiting hours are 8a-8p. Overnight visitors will be at the discretion of the nurse. All above information reviewed with patient in person or by phone. Patient verbalizes understanding. All questions and concerns addressed.                                                                                                  Patient/Rep_patient___________________                                                                                                                                    PRE OP INSTRUCTIONS

## 2022-06-20 NOTE — LETTER
[facility] [place] [address] [address]  Phone: [phone]  Fax: [phone]    Mack Cabrera MD        June 20, 2022     Patient: Naye Vega   YOB: 1950   Date of Visit: 6/20/2022       To Whom It May Concern: It is my medical opinion that Marie Yates can proceed with right ankle surgery (excision of mass) at low to moderate cardiac risk. If you have any questions or concerns, please don't hesitate to call.     Sincerely,        Mack Cabrera MD

## 2022-06-22 NOTE — PROGRESS NOTES
Called time change to patient to 0800 surgery time with 0600 arrival time. Patient verbalized understanding.

## 2022-06-23 ENCOUNTER — HOSPITAL ENCOUNTER (OUTPATIENT)
Age: 72
Setting detail: OUTPATIENT SURGERY
Discharge: HOME | End: 2022-06-23
Attending: ORTHOPAEDIC SURGERY | Admitting: ORTHOPAEDIC SURGERY
Payer: MEDICARE

## 2022-06-23 ENCOUNTER — ANESTHESIA (OUTPATIENT)
Dept: OPERATING ROOM | Age: 72
End: 2022-06-23
Payer: MEDICARE

## 2022-06-23 ENCOUNTER — ANESTHESIA EVENT (OUTPATIENT)
Dept: OPERATING ROOM | Age: 72
End: 2022-06-23
Payer: MEDICARE

## 2022-06-23 VITALS
HEART RATE: 54 BPM | OXYGEN SATURATION: 95 % | TEMPERATURE: 97.5 F | WEIGHT: 168 LBS | SYSTOLIC BLOOD PRESSURE: 125 MMHG | DIASTOLIC BLOOD PRESSURE: 78 MMHG | BODY MASS INDEX: 29.77 KG/M2 | HEIGHT: 63 IN | RESPIRATION RATE: 14 BRPM

## 2022-06-23 DIAGNOSIS — R22.41 ANKLE MASS, RIGHT: Primary | ICD-10-CM

## 2022-06-23 DIAGNOSIS — R22.41 MASS OF ANKLE, RIGHT: ICD-10-CM

## 2022-06-23 PROCEDURE — 7100000011 HC PHASE II RECOVERY - ADDTL 15 MIN: Performed by: ORTHOPAEDIC SURGERY

## 2022-06-23 PROCEDURE — 2580000003 HC RX 258: Performed by: ORTHOPAEDIC SURGERY

## 2022-06-23 PROCEDURE — 7100000010 HC PHASE II RECOVERY - FIRST 15 MIN: Performed by: ORTHOPAEDIC SURGERY

## 2022-06-23 PROCEDURE — 6360000002 HC RX W HCPCS: Performed by: NURSE ANESTHETIST, CERTIFIED REGISTERED

## 2022-06-23 PROCEDURE — 3700000001 HC ADD 15 MINUTES (ANESTHESIA): Performed by: ORTHOPAEDIC SURGERY

## 2022-06-23 PROCEDURE — 3600000012 HC SURGERY LEVEL 2 ADDTL 15MIN: Performed by: ORTHOPAEDIC SURGERY

## 2022-06-23 PROCEDURE — 88304 TISSUE EXAM BY PATHOLOGIST: CPT

## 2022-06-23 PROCEDURE — 6360000002 HC RX W HCPCS: Performed by: ORTHOPAEDIC SURGERY

## 2022-06-23 PROCEDURE — 2709999900 HC NON-CHARGEABLE SUPPLY: Performed by: ORTHOPAEDIC SURGERY

## 2022-06-23 PROCEDURE — 7100000001 HC PACU RECOVERY - ADDTL 15 MIN: Performed by: ORTHOPAEDIC SURGERY

## 2022-06-23 PROCEDURE — 27634 EXC LEG/ANKLE TUM DEP 5 CM/>: CPT | Performed by: NURSE PRACTITIONER

## 2022-06-23 PROCEDURE — 3600000002 HC SURGERY LEVEL 2 BASE: Performed by: ORTHOPAEDIC SURGERY

## 2022-06-23 PROCEDURE — 3700000000 HC ANESTHESIA ATTENDED CARE: Performed by: ORTHOPAEDIC SURGERY

## 2022-06-23 PROCEDURE — 27634 EXC LEG/ANKLE TUM DEP 5 CM/>: CPT | Performed by: ORTHOPAEDIC SURGERY

## 2022-06-23 PROCEDURE — 7100000000 HC PACU RECOVERY - FIRST 15 MIN: Performed by: ORTHOPAEDIC SURGERY

## 2022-06-23 PROCEDURE — 2500000003 HC RX 250 WO HCPCS: Performed by: NURSE ANESTHETIST, CERTIFIED REGISTERED

## 2022-06-23 PROCEDURE — 2500000003 HC RX 250 WO HCPCS: Performed by: ORTHOPAEDIC SURGERY

## 2022-06-23 RX ORDER — FENTANYL CITRATE 50 UG/ML
INJECTION, SOLUTION INTRAMUSCULAR; INTRAVENOUS PRN
Status: DISCONTINUED | OUTPATIENT
Start: 2022-06-23 | End: 2022-06-23 | Stop reason: SDUPTHER

## 2022-06-23 RX ORDER — FENTANYL CITRATE 50 UG/ML
25 INJECTION, SOLUTION INTRAMUSCULAR; INTRAVENOUS EVERY 5 MIN PRN
Status: DISCONTINUED | OUTPATIENT
Start: 2022-06-23 | End: 2022-06-23 | Stop reason: HOSPADM

## 2022-06-23 RX ORDER — PROCHLORPERAZINE EDISYLATE 5 MG/ML
5 INJECTION INTRAMUSCULAR; INTRAVENOUS
Status: DISCONTINUED | OUTPATIENT
Start: 2022-06-23 | End: 2022-06-23 | Stop reason: HOSPADM

## 2022-06-23 RX ORDER — HYDRALAZINE HYDROCHLORIDE 20 MG/ML
10 INJECTION INTRAMUSCULAR; INTRAVENOUS
Status: DISCONTINUED | OUTPATIENT
Start: 2022-06-23 | End: 2022-06-23 | Stop reason: HOSPADM

## 2022-06-23 RX ORDER — LIDOCAINE HYDROCHLORIDE 10 MG/ML
1 INJECTION, SOLUTION EPIDURAL; INFILTRATION; INTRACAUDAL; PERINEURAL
Status: DISCONTINUED | OUTPATIENT
Start: 2022-06-23 | End: 2022-06-23 | Stop reason: HOSPADM

## 2022-06-23 RX ORDER — HYDROMORPHONE HCL 110MG/55ML
0.5 PATIENT CONTROLLED ANALGESIA SYRINGE INTRAVENOUS EVERY 5 MIN PRN
Status: DISCONTINUED | OUTPATIENT
Start: 2022-06-23 | End: 2022-06-23 | Stop reason: HOSPADM

## 2022-06-23 RX ORDER — TRAMADOL HYDROCHLORIDE 50 MG/1
50 TABLET ORAL EVERY 6 HOURS PRN
Qty: 12 TABLET | Refills: 0 | Status: SHIPPED | OUTPATIENT
Start: 2022-06-23 | End: 2022-06-26

## 2022-06-23 RX ORDER — SODIUM CHLORIDE 9 MG/ML
INJECTION, SOLUTION INTRAVENOUS CONTINUOUS
Status: DISCONTINUED | OUTPATIENT
Start: 2022-06-23 | End: 2022-06-23 | Stop reason: HOSPADM

## 2022-06-23 RX ORDER — LABETALOL HYDROCHLORIDE 5 MG/ML
10 INJECTION, SOLUTION INTRAVENOUS
Status: DISCONTINUED | OUTPATIENT
Start: 2022-06-23 | End: 2022-06-23 | Stop reason: HOSPADM

## 2022-06-23 RX ORDER — OXYCODONE HYDROCHLORIDE 5 MG/1
5 TABLET ORAL
Status: DISCONTINUED | OUTPATIENT
Start: 2022-06-23 | End: 2022-06-23 | Stop reason: HOSPADM

## 2022-06-23 RX ORDER — DEXAMETHASONE SODIUM PHOSPHATE 10 MG/ML
INJECTION, SOLUTION INTRAMUSCULAR; INTRAVENOUS PRN
Status: DISCONTINUED | OUTPATIENT
Start: 2022-06-23 | End: 2022-06-23 | Stop reason: SDUPTHER

## 2022-06-23 RX ORDER — BUPIVACAINE HYDROCHLORIDE 5 MG/ML
INJECTION, SOLUTION EPIDURAL; INTRACAUDAL
Status: COMPLETED | OUTPATIENT
Start: 2022-06-23 | End: 2022-06-23

## 2022-06-23 RX ORDER — LIDOCAINE HYDROCHLORIDE 20 MG/ML
INJECTION, SOLUTION INFILTRATION; PERINEURAL PRN
Status: DISCONTINUED | OUTPATIENT
Start: 2022-06-23 | End: 2022-06-23 | Stop reason: SDUPTHER

## 2022-06-23 RX ORDER — ONDANSETRON 2 MG/ML
INJECTION INTRAMUSCULAR; INTRAVENOUS PRN
Status: DISCONTINUED | OUTPATIENT
Start: 2022-06-23 | End: 2022-06-23 | Stop reason: SDUPTHER

## 2022-06-23 RX ORDER — PROPOFOL 10 MG/ML
INJECTION, EMULSION INTRAVENOUS PRN
Status: DISCONTINUED | OUTPATIENT
Start: 2022-06-23 | End: 2022-06-23 | Stop reason: SDUPTHER

## 2022-06-23 RX ORDER — ONDANSETRON 2 MG/ML
4 INJECTION INTRAMUSCULAR; INTRAVENOUS
Status: DISCONTINUED | OUTPATIENT
Start: 2022-06-23 | End: 2022-06-23 | Stop reason: HOSPADM

## 2022-06-23 RX ORDER — CEPHALEXIN 500 MG/1
500 CAPSULE ORAL 4 TIMES DAILY
Qty: 12 CAPSULE | Refills: 0 | Status: SHIPPED | OUTPATIENT
Start: 2022-06-23 | End: 2022-06-26

## 2022-06-23 RX ADMIN — PROPOFOL 60 MG: 10 INJECTION, EMULSION INTRAVENOUS at 08:30

## 2022-06-23 RX ADMIN — LIDOCAINE HYDROCHLORIDE 100 MG: 20 INJECTION, SOLUTION INFILTRATION; PERINEURAL at 08:12

## 2022-06-23 RX ADMIN — FENTANYL CITRATE 50 MCG: 50 INJECTION, SOLUTION INTRAMUSCULAR; INTRAVENOUS at 08:29

## 2022-06-23 RX ADMIN — FENTANYL CITRATE 50 MCG: 50 INJECTION, SOLUTION INTRAMUSCULAR; INTRAVENOUS at 08:11

## 2022-06-23 RX ADMIN — SODIUM CHLORIDE: 9 INJECTION, SOLUTION INTRAVENOUS at 07:23

## 2022-06-23 RX ADMIN — ONDANSETRON 4 MG: 2 INJECTION INTRAMUSCULAR; INTRAVENOUS at 08:35

## 2022-06-23 RX ADMIN — CEFAZOLIN 2000 MG: 2 INJECTION, POWDER, FOR SOLUTION INTRAMUSCULAR; INTRAVENOUS at 08:06

## 2022-06-23 RX ADMIN — PROPOFOL 200 MG: 10 INJECTION, EMULSION INTRAVENOUS at 08:12

## 2022-06-23 RX ADMIN — DEXAMETHASONE SODIUM PHOSPHATE 8 MG: 10 INJECTION, SOLUTION INTRAMUSCULAR; INTRAVENOUS at 08:35

## 2022-06-23 ASSESSMENT — PAIN - FUNCTIONAL ASSESSMENT: PAIN_FUNCTIONAL_ASSESSMENT: 0-10

## 2022-06-23 ASSESSMENT — ENCOUNTER SYMPTOMS: SHORTNESS OF BREATH: 0

## 2022-06-23 NOTE — PROGRESS NOTES
Received from OR - Drowsy,simple mask,right ankle dressing/ace dry and intact,circulation good,elevated,ice pack placed,warmer,vss.

## 2022-06-23 NOTE — BRIEF OP NOTE
Brief Postoperative Note      Patient: Amina Madsen  YOB: 1950  MRN: [id number]    Date of Procedure: 6/23/2022    Pre-Op Diagnosis: Deep Mass of ankle, right [R22.41]    Post-Op Diagnosis: Same       Procedure(s):  RIGHT LATERAL ANKLE MASS EXCISION    Surgeon(s):  Oliver Deng MD    Assistant: Patito Engel CNP    Anesthesia: General    Estimated Blood Loss (mL): Minimal    Complications: None    Specimens:   ID Type Source Tests Collected by Time Destination   A : A. RIGHT LATERAL ANKLE MASS Tissue Tissue SURGICAL PATHOLOGY Oliver Deng MD 6/23/2022 0736        Implants:  * No implants in log *      Drains: * No LDAs found *    Findings: Same.     Electronically signed by Oliver Deng MD on 6/23/2022 at 12:35 PM

## 2022-06-23 NOTE — ANESTHESIA PRE PROCEDURE
Department of Anesthesiology  Preprocedure Note       Name:  Eric Lockhart   Age:  80 y.o.  :  1950                                          MRN:  [id number]         Date:  2022      Surgeon: Brissa Hopson):  Lashanda Sanchez MD    Procedure: Procedure(s):  RIGHT LATERAL ANKLE MASS EXCISION    Medications prior to admission:   Prior to Admission medications    Medication Sig Start Date End Date Taking? Authorizing Provider   traMADol (ULTRAM) 50 MG tablet Take 1 tablet by mouth every 6 hours as needed for Pain for up to 3 days. Intended supply: 3 days. Take lowest dose possible to manage pain 22 Yes Lashanda Sanchez MD   cephALEXin (KEFLEX) 500 MG capsule Take 1 capsule by mouth 4 times daily for 3 days 22 Yes Lashanda Sanchez MD   rosuvastatin (CRESTOR) 10 MG tablet Take 1 tablet by mouth daily 22   Collin Gonzalez MD   Acetaminophen (TYLENOL 8 HOUR PO) Take by mouth    Historical Provider, MD   pantoprazole (PROTONIX) 40 MG tablet Take 40 mg by mouth in the morning and at bedtime  22   Historical MD Misa   ferrous sulfate (IRON 325) 325 (65 Fe) MG tablet Take 1 tablet by mouth daily (with breakfast) 22   Bryant Romero MD   hydroCHLOROthiazide (HYDRODIURIL) 25 MG tablet TAKE 1 TABLET DAILY 22   Collin Gonzalez MD   Vitamin D (CHOLECALCIFEROL) 25 MCG (1000 UT) TABS tablet Take by mouth daily     Historical Provider, MD   Multiple Vitamins-Minerals (THERAPEUTIC MULTIVITAMIN-MINERALS) tablet Take 1 tablet by mouth daily. Historical Provider, MD       Current medications:    Current Facility-Administered Medications   Medication Dose Route Frequency Provider Last Rate Last Admin    ceFAZolin (ANCEF) 2,000 mg in dextrose 5 % 50 mL IVPB (mini-bag)  2,000 mg IntraVENous Once Lashanda Sanchez MD        0.9 % sodium chloride infusion   IntraVENous Continuous Lashanda Sanchez  mL/hr at 22 07 New Bag at 22       Allergies:     Allergies Allergen Reactions    Nsaids Other (See Comments)     PUD       Problem List:    Patient Active Problem List   Diagnosis Code    Hypertriglyceridemia E78.1    Osteopenia M85.80    Back pain, chronic M54.9, G89.29    Hyperglycemia R73.9    Sinus bradycardia R00.1    Heel pain M79.673    Abnormal echocardiogram R93.1    Occlusion and stenosis of carotid artery I65.29    Chest pain R07.9    Bilateral knee pain M25.561, M25.562    Chronic back pain M54.9, G89.29    Renal cyst, left N28.1    Knee pain M25.569    Osteoporosis M81.0    Palpitations R00.2    Essential hypertension I10    Dilated aortic root (HCC) I77.810    Chronic bilateral low back pain without sciatica M54.50, G89.29    Ankle mass, right R22.41       Past Medical History:        Diagnosis Date    Abnormal EKG     non specific T w abnl    Aorta aneurysm [facility]) Oct 2014    echo and CT mild ao dilatation root/arch     Cataract     Cyst of left kidney Oct 2014    by CT followed by urology    Fatty liver Oct 2014    on CT     HTN (hypertension)     Hyperlipidemia     Hypertriglyceridemia     Osteoarthritis     Osteopenia     PUD (peptic ulcer disease)     nsaid     PUD (peptic ulcer disease)     gastric ulcer    Sinus bradycardia     Syncope     remote, carotid us normal        Past Surgical History:        Procedure Laterality Date    CARPAL TUNNEL RELEASE      , release of right thumb    CATARACT REMOVAL          COLONOSCOPY  2015    Dr. Emmanuel Myers, mild diverticulosis    UPPER GASTROINTESTINAL ENDOSCOPY  2013    , Gastric Ulcer. Social History:    Social History     Tobacco Use    Smoking status: Former Smoker     Packs/day: 0.25     Years: 25.00     Pack years: 6.25     Quit date: 2008     Years since quittin.6    Smokeless tobacco: Never Used   Substance Use Topics    Alcohol use:  No                                Counseling given: Not Answered      Vital Signs (Current):   Vitals:    06/20/22 1402 06/23/22 0645   BP:  133/66   Pulse:  57   Resp:  16   Temp:  97.6 °F (36.4 °C)   TempSrc:  Temporal   SpO2:  99%   Weight: 168 lb (76.2 kg) 168 lb (76.2 kg)   Height: 5' 3\" (1.6 m) 5' 3\" (1.6 m)                                              BP Readings from Last 3 Encounters:   06/23/22 133/66   06/20/22 120/80   05/13/22 112/64       NPO Status: Time of last liquid consumption: 0000                        Time of last solid consumption: 0000                        Date of last liquid consumption: 06/23/22                        Date of last solid food consumption: 06/23/22    BMI:   Wt Readings from Last 3 Encounters:   06/23/22 168 lb (76.2 kg)   06/20/22 167 lb (75.8 kg)   06/14/22 168 lb (76.2 kg)     Body mass index is 29.76 kg/m². CBC:   Lab Results   Component Value Date    WBC 4.8 05/27/2022    RBC 3.25 05/27/2022    HGB 9.4 05/27/2022    HCT 29.1 05/27/2022    MCV 89.4 05/27/2022    RDW 15.5 05/27/2022     05/27/2022       CMP:   Lab Results   Component Value Date     05/09/2022    K 3.5 05/09/2022     05/09/2022    CO2 27 05/09/2022    BUN 36 05/09/2022    CREATININE 0.76 05/09/2022    GFRAA >60 09/14/2021    GFRAA >60 01/08/2013    AGRATIO 1.7 09/14/2021    LABGLOM >60 09/14/2021    LABGLOM 74 12/03/2013    GLUCOSE 119 05/09/2022    PROT 7.6 09/14/2021    PROT 7.3 01/16/2013    CALCIUM 9.0 05/09/2022    BILITOT 0.3 09/14/2021    ALKPHOS 54 09/14/2021    AST 27 09/14/2021    ALT 20 09/14/2021       POC Tests: No results for input(s): POCGLU, POCNA, POCK, POCCL, POCBUN, POCHEMO, POCHCT in the last 72 hours.     Coags: No results found for: PROTIME, INR, APTT    HCG (If Applicable): No results found for: PREGTESTUR, PREGSERUM, HCG, HCGQUANT     ABGs: No results found for: PHART, PO2ART, BPK0SHR, RNL5VRW, BEART, V0QSPWMF     Type & Screen (If Applicable):  Lab Results   Component Value Date    LABABO O 05/09/2022    LABRH Positive 05/09/2022 Drug/Infectious Status (If Applicable):  No results found for: HIV, HEPCAB    COVID-19 Screening (If Applicable):   Lab Results   Component Value Date    COVID19 Not Detected 05/08/2022           Anesthesia Evaluation  Patient summary reviewed and Nursing notes reviewed no history of anesthetic complications:   Airway: Mallampati: I  TM distance: >3 FB   Neck ROM: full  Mouth opening: > = 3 FB   Dental: normal exam         Pulmonary:       (-) asthma and shortness of breath                           Cardiovascular:    (+) hypertension:, hyperlipidemia    (-)  angina          Echocardiogram reviewed               ROS comment: Aortic root aneurysm 4.4 per pt, watching, no new changes     Neuro/Psych:      (-) CVA           GI/Hepatic/Renal:   (+) PUD, liver disease:,      (-) GERD       Endo/Other:    (+) : arthritis: OA., .    (-) diabetes mellitus, hypothyroidism               Abdominal:             Vascular:     - PVD. Other Findings:           Anesthesia Plan      general     ASA 3       Induction: intravenous. MIPS: Postoperative opioids intended and Prophylactic antiemetics administered. Anesthetic plan and risks discussed with patient. Use of blood products discussed with patient whom. Plan discussed with CRNA.                     Rupal Castellon MD   6/23/2022

## 2022-06-23 NOTE — PROGRESS NOTES
Teaching/ education completed for home care including pain management, wound care and infection control. Patient verbalized understanding. Discharge instructions reviewed with patient/responsible adult. All home medications have been reviewed, questions answered and patient verbalized understanding. Discharge instructions signed and copies given. Patient discharged per w/c with belongings.

## 2022-06-23 NOTE — H&P
Preoperative H&P Update    The patient's History and Physical in the medical record from 6/14/2022 was reviewed by me today. Past Medical History:   Diagnosis Date    Abnormal EKG 2020    non specific T w abnl    Aorta aneurysm [facility]) Oct 2014    echo and CT mild ao dilatation root/arch     Cataract     Cyst of left kidney Oct 2014    by CT followed by urology    Fatty liver Oct 2014    on CT     HTN (hypertension)     Hyperlipidemia     Hypertriglyceridemia     Osteoarthritis     Osteopenia     PUD (peptic ulcer disease)     nsaid     PUD (peptic ulcer disease)     gastric ulcer    Sinus bradycardia     Syncope     remote, carotid us normal      Past Surgical History:   Procedure Laterality Date    CARPAL TUNNEL RELEASE      2004, release of right thumb    CATARACT REMOVAL      2020    COLONOSCOPY  12/16/2015    Dr. Lucy Watts, mild diverticulosis    UPPER GASTROINTESTINAL ENDOSCOPY  5/9/2013    , Gastric Ulcer. No current facility-administered medications on file prior to encounter. Current Outpatient Medications on File Prior to Encounter   Medication Sig Dispense Refill    Acetaminophen (TYLENOL 8 HOUR PO) Take by mouth      pantoprazole (PROTONIX) 40 MG tablet Take 40 mg by mouth in the morning and at bedtime       ferrous sulfate (IRON 325) 325 (65 Fe) MG tablet Take 1 tablet by mouth daily (with breakfast) 30 tablet 1    hydroCHLOROthiazide (HYDRODIURIL) 25 MG tablet TAKE 1 TABLET DAILY 90 tablet 2    Vitamin D (CHOLECALCIFEROL) 25 MCG (1000 UT) TABS tablet Take by mouth daily       Multiple Vitamins-Minerals (THERAPEUTIC MULTIVITAMIN-MINERALS) tablet Take 1 tablet by mouth daily. Allergies   Allergen Reactions    Nsaids Other (See Comments)     PUD      I reviewed the HPI, medications, allergies, reason for surgery, diagnosis and treatment plan and there has been no change. The patient was evaluated by me today.  Physical exam findings for this update include: There were no vitals filed for this visit. Airway is intact  Chest: chest clear, no wheezing, rales, normal symmetric air entry, no tachypnea, retractions or cyanosis  Heart: regular rate and rhythm ; heart sounds normal  Findings on exam of the body region where surgery is to be performed include:  Right lateral ankle pain/ mass (Lipoma).     Electronically signed by Sangeetha Poole MD on 6/23/2022 at 6:56 AM

## 2022-06-23 NOTE — ANESTHESIA POSTPROCEDURE EVALUATION
Department of Anesthesiology  Postprocedure Note    Patient: Chloe Yo  MRN: [id number]  YOB: 1950  Date of evaluation: 6/23/2022      Procedure Summary     Date: 06/23/22 Room / Location: [address]    Anesthesia Start: 6320 Anesthesia Stop: 0908    Procedure: RIGHT LATERAL ANKLE MASS EXCISION (Right ) Diagnosis:       Mass of ankle, right      (Mass of ankle, right [R22.41])    Surgeons: Opal Torres MD Responsible Provider: Breana Goyal MD    Anesthesia Type: general ASA Status: 3          Anesthesia Type: No value filed. Garcia Phase I: Garcia Score: 8    Garcia Phase II:      Last vitals:   Vitals Value Taken Time   /61 06/23/22 0915   Temp 97 °F (36.1 °C) 06/23/22 0904   Pulse 61 06/23/22 0919   Resp 11 06/23/22 0919   SpO2 96 % 06/23/22 0919   Vitals shown include unvalidated device data.       Anesthesia Post Evaluation    Patient location during evaluation: PACU  Patient participation: complete - patient participated  Level of consciousness: awake and alert  Airway patency: patent  Nausea & Vomiting: no nausea and no vomiting  Complications: no  Cardiovascular status: hemodynamically stable  Respiratory status: acceptable  Hydration status: stable  Multimodal analgesia pain management approach

## 2022-06-23 NOTE — PROGRESS NOTES
Spoke with Dr Yuly Champagne, patient informed to not take 325 mg, go down to 81 mg aspirin daily postop, since having history recent GI ulcer, patient verbalized understanding.

## 2022-06-24 NOTE — OP NOTE
[facility]                     [facility], [address] [place] [address]                                OPERATIVE REPORT    PATIENT NAME: Nieves Tanner                   :        1950  MED REC NO:   [id number]                          ROOM:  ACCOUNT NO:   [de-identified]                           ADMIT DATE: 2022  PROVIDER:     Supriya Fisher MD    DATE OF PROCEDURE:  2022    PRIMARY CARE PHYSICIAN:  Alfonso Garay MD    PREOPERATIVE DIAGNOSIS:  Right ankle lateral deep large mass/lipoma. POSTOPERATIVE DIAGNOSIS:  Right ankle lateral deep large mass/lipoma. OPERATION PERFORMED:  Excision tumor/soft tissue deep mass right ankle  subfascial measuring 6 cm. SURGEON:  Supriya Fisher MD    ASSISTANT:  Fransisco Garcia CNP    ANESTHESIA:  General anesthesia. ESTIMATED BLOOD LOSS:  Minimal.    COMPLICATIONS:  None. TOURNIQUET:  Right upper calf 250 mmHg. FINDINGS:  Large deep mass subfascial measuring 6 cm - compatible with  lipoma. SPECIMEN:  Right ankle deep mass. INDICATIONS:  This is a 67-year-old white female who presented to our  office for a consultation for right lateral ankle mass. She was  diagnosed with deep lipoma. All risks, benefits, and alternatives were  discussed with the patient and she agreed to proceed with surgical  excision. DESCRIPTION OF PROCEDURE:  The patient's right ankle was marked. She  received 2 gm Ancef IV preoperatively. The patient was then brought to  the operating room and underwent general anesthesia. A well-padded  tourniquet was placed in the right upper calf. The right lower  extremity was then prepped and draped in a regular sterile routine  fashion. A time-out was called confirming the patient name, site and  procedure. Esmarch was used for exsanguination and tourniquet was inflated to 250  mmHg. A lateral longitudinal incision was made over the mass. Careful  dissection was performed.   It was found to be a large lipoma which was  deep to the fascia. Careful dissection was performed and then we were  able to excise the whole mass. It measured 6 cm. It was sent for  histopathology. At this point, we reexamined the site of the mass and  we could not find any other lesion. It was compatible with lipoma. We  then let the tourniquet down. Hemostasis was secured. We irrigated the  incision copiously with normal saline mixed with gentamicin. We then  closed the subcu with 3-0 Vicryl and tacked it down to the fascia to  prevent the deep space. We then closed the skin with 4-0 Monocryl. Steri-Strips were then applied. Dressing was then applied in the form  of Xeroform, 4 x 4, sterile Webril, and Ace wrap. The patient tolerated the procedure well and was taken to the recovery  in stable condition. Dash Munson CNP was 1st Assist given the nature of the procedure that needed advanced assistance. She assisted in all aspect of the procedure, including but limited to draping in a sterile fashion, exposure of surgical area, controlling bleeding, retracting and protecting important structures, and surgical wound closure with dressing application. POSTOPERATIVE PLAN:  The patient will be discharged home. She is  allowed to be weightbearing as tolerated, but no heavy impact activities  for at least two to three weeks.         Carlie Erwin MD    D: 06/23/2022 12:40:58       T: 06/24/2022 0:16:51     [email or web address]  Job#: [id number]     Doc#: [id number]    CC:  Carlos Patel MD

## 2022-07-05 ENCOUNTER — OFFICE VISIT (OUTPATIENT)
Dept: ORTHOPEDIC SURGERY | Age: 72
End: 2022-07-05
Payer: MEDICARE

## 2022-07-05 VITALS — WEIGHT: 168 LBS | HEIGHT: 63 IN | BODY MASS INDEX: 29.77 KG/M2

## 2022-07-05 DIAGNOSIS — R22.41 ANKLE MASS, RIGHT: ICD-10-CM

## 2022-07-05 DIAGNOSIS — S90.01XA HEMATOMA OF RIGHT ANKLE: Primary | ICD-10-CM

## 2022-07-05 DIAGNOSIS — M25.571 ACUTE RIGHT ANKLE PAIN: ICD-10-CM

## 2022-07-05 PROCEDURE — 10140 I&D HMTMA SEROMA/FLUID COLLJ: CPT | Performed by: ORTHOPAEDIC SURGERY

## 2022-07-05 PROCEDURE — 99024 POSTOP FOLLOW-UP VISIT: CPT | Performed by: ORTHOPAEDIC SURGERY

## 2022-07-05 RX ORDER — CEPHALEXIN 500 MG/1
500 CAPSULE ORAL 4 TIMES DAILY
Qty: 40 CAPSULE | Refills: 0 | Status: SHIPPED | OUTPATIENT
Start: 2022-07-05 | End: 2022-07-15

## 2022-07-06 ENCOUNTER — TELEPHONE (OUTPATIENT)
Dept: ORTHOPEDIC SURGERY | Age: 72
End: 2022-07-06

## 2022-07-06 NOTE — TELEPHONE ENCOUNTER
Surgery and/or Procedure Scheduling     Contact Name:Natty Noonan  Surgical/Procedure Request: SX ON RT ANKLE 6/23/22  Patient Contact Number:[phone]    PATIENT CALL AND SHE JUST HAVE SOME QUESTIONS ABOUT IF SHE CAN GET HER ANKLE WET AND SHE JUST NEED SOME CLARITY ABOUT THIS MATTER. PLEASE ADVISE.

## 2022-07-12 PROBLEM — S90.01XA: Status: ACTIVE | Noted: 2022-07-12

## 2022-07-12 RX ORDER — TRAMADOL HYDROCHLORIDE 50 MG/1
50 TABLET ORAL EVERY 8 HOURS PRN
Qty: 15 TABLET | Refills: 0 | Status: SHIPPED | OUTPATIENT
Start: 2022-07-12 | End: 2022-07-14

## 2022-07-12 NOTE — PROGRESS NOTES
DIAGNOSIS:   1- Right ankle lateral mass removal/ lipoma. 2- Right ankle hematoma. DATE OF SURGERY:  6/23/2022. HISTORY OF PRESENT ILLNESS:  Ms. Tobar Pro 80 y.o.  female who came in today for 2 weeks postoperative visit. The patient denies any significant pain in the right, 2/10, but c/o swelling. She has been WBAT. No numbness or tingling sensation. No fever or Chills. PHYSICAL EXAMINATION:  The incision slightly open with organized hematoma (evacuated today). Mild erythema, moderate swelling. She has no pain with the active or passive range of motion of the right ankle, good ROM. She has intact sensation distally, and she is neurovascularly intact. PROCEDURE:    With the patient's permission, and under sterile condition, the right ankle was prepared and draped with alcohol and hematoma was evacuated using manual pressure through the wound opening. The patient tolerated the procedure well. A Drsg was applied and the patient was advised to ice the ankle for 15-20 minutes to relieve any injection site related pain. She reported a good improvement immediatly after the injection. PATH:  FINAL DIAGNOSIS:     Right lateral ankle mass, excision:   - Mature adipose tissue consistent with lipoma. IMPRESSION:    1- Right ankle lateral mass removal/ lipoma. 2- Right ankle hematoma. PLAN: She can go back to normal activity with no heavy impact activities for 6 weeks. Keflex PO. The patient will come back for a follow up next week for wound check. The patient understands that there is a chance of lipoma and hematoma recurrence even after surgical excision.       Danielle Saab MD

## 2022-07-14 ENCOUNTER — OFFICE VISIT (OUTPATIENT)
Dept: ORTHOPEDIC SURGERY | Age: 72
End: 2022-07-14

## 2022-07-14 VITALS — HEIGHT: 63 IN | BODY MASS INDEX: 29.77 KG/M2 | WEIGHT: 168 LBS

## 2022-07-14 DIAGNOSIS — D62 ANEMIA DUE TO BLOOD LOSS, ACUTE: Primary | ICD-10-CM

## 2022-07-14 DIAGNOSIS — D17.23 LIPOMA OF RIGHT LOWER EXTREMITY: Primary | ICD-10-CM

## 2022-07-14 PROCEDURE — 99024 POSTOP FOLLOW-UP VISIT: CPT | Performed by: NURSE PRACTITIONER

## 2022-07-14 PROCEDURE — APPNB15 APP NON BILLABLE TIME 0-15 MINS: Performed by: NURSE PRACTITIONER

## 2022-07-15 NOTE — TELEPHONE ENCOUNTER
Medication Refill    Medication needing refilled:  rosuvastatin (CRESTOR)    Dosage of the medication:  10 MG tablet     How are you taking this medication (QD, BID, TID, QID, PRN):  Take 1 tablet by mouth daily    30 or 90 day supply called in:  90 Tablet    When will you run out of your medication:  7/14/2022    Which Pharmacy are we sending the medication to?:    [address] , [address] [place] [address] [phone] - F [phone]   [place] [address]   Phone:  [phone]  Fax:  [phone]

## 2022-07-19 RX ORDER — ROSUVASTATIN CALCIUM 10 MG/1
10 TABLET, COATED ORAL DAILY
Qty: 90 TABLET | Refills: 1 | Status: SHIPPED | OUTPATIENT
Start: 2022-07-19

## 2022-07-19 NOTE — TELEPHONE ENCOUNTER
Patient calling in wanting to change pharmacy     Last OV: 06.20.22 [address]: 09/14/21  Next appt: 06.20.23

## 2022-07-28 ENCOUNTER — OFFICE VISIT (OUTPATIENT)
Dept: ORTHOPEDIC SURGERY | Age: 72
End: 2022-07-28

## 2022-07-28 VITALS — HEIGHT: 63 IN | WEIGHT: 168 LBS | BODY MASS INDEX: 29.77 KG/M2

## 2022-07-28 DIAGNOSIS — R22.41 ANKLE MASS, RIGHT: Primary | ICD-10-CM

## 2022-07-28 PROCEDURE — 99024 POSTOP FOLLOW-UP VISIT: CPT | Performed by: NURSE PRACTITIONER

## 2022-07-28 PROCEDURE — APPNB15 APP NON BILLABLE TIME 0-15 MINS: Performed by: NURSE PRACTITIONER

## 2022-07-29 NOTE — PROGRESS NOTES
DIAGNOSIS:   1- Right ankle lateral mass removal/ lipoma. 2- Right ankle hematoma. DATE OF SURGERY:  6/23/2022. HISTORY OF PRESENT ILLNESS:  Ms. Mayelin Horton 80 y.o.  female who came in today for 4 weeks postoperative visit for wound check. The patient denies any significant pain in the right, 0/10, but c/o improvement in her drainage and wound. She has been WBAT. She is changing her dressing daily. No numbness or tingling sensation. No fever or Chills. She is currently on cephalexin. PHYSICAL EXAMINATION:  The incision slightly open, but smaller in size. Minimal erythema, Mild swelling. There is a small amount of serous drainage. No warmth or odor. She has no pain with the active or passive range of motion of the right ankle, good ROM. She has intact sensation distally, and she is neurovascularly intact. PATH:  FINAL DIAGNOSIS:     Right lateral ankle mass, excision:   - Mature adipose tissue consistent with lipoma. IMPRESSION:    1- Right ankle lateral mass removal/ lipoma. 2- Right ankle hematoma. PLAN: She can go back to normal activity with no heavy impact activities for 6 weeks. Keflex PO intake until gone. Dressing change daily, demonstrated to her today and instructed in care. The patient will come back for a follow up in 4 weeks for wound check or sooner if worsens. The patient understands that there is a chance of lipoma and hematoma recurrence even after surgical excision.       Marcella Pitts, ROZINA - CNP

## 2022-08-10 DIAGNOSIS — D62 ANEMIA DUE TO BLOOD LOSS, ACUTE: ICD-10-CM

## 2022-08-10 LAB
BASOPHILS ABSOLUTE: 0.1 K/UL (ref 0–0.2)
BASOPHILS RELATIVE PERCENT: 0.6 %
EOSINOPHILS ABSOLUTE: 0.2 K/UL (ref 0–0.6)
EOSINOPHILS RELATIVE PERCENT: 2 %
HCT VFR BLD CALC: 37.5 % (ref 36–48)
HEMOGLOBIN: 12.3 G/DL (ref 12–16)
IRON SATURATION: 19 % (ref 15–50)
IRON: 89 UG/DL (ref 37–145)
LYMPHOCYTES ABSOLUTE: 3.3 K/UL (ref 1–5.1)
LYMPHOCYTES RELATIVE PERCENT: 37.3 %
MCH RBC QN AUTO: 26.9 PG (ref 26–34)
MCHC RBC AUTO-ENTMCNC: 32.8 G/DL (ref 31–36)
MCV RBC AUTO: 82 FL (ref 80–100)
MONOCYTES ABSOLUTE: 0.7 K/UL (ref 0–1.3)
MONOCYTES RELATIVE PERCENT: 7.4 %
NEUTROPHILS ABSOLUTE: 4.7 K/UL (ref 1.7–7.7)
NEUTROPHILS RELATIVE PERCENT: 52.7 %
PDW BLD-RTO: 14.3 % (ref 12.4–15.4)
PLATELET # BLD: 286 K/UL (ref 135–450)
PMV BLD AUTO: 8.4 FL (ref 5–10.5)
RBC # BLD: 4.57 M/UL (ref 4–5.2)
TOTAL IRON BINDING CAPACITY: 471 UG/DL (ref 260–445)
WBC # BLD: 8.8 K/UL (ref 4–11)

## 2022-09-10 ENCOUNTER — HOSPITAL ENCOUNTER (OUTPATIENT)
Age: 72
Discharge: HOME | End: 2022-09-10
Payer: MEDICARE

## 2022-09-10 DIAGNOSIS — E78.1 HYPERTRIGLYCERIDEMIA: ICD-10-CM

## 2022-09-10 DIAGNOSIS — I25.10 CORONARY ARTERY CALCIFICATION SEEN ON CT SCAN: ICD-10-CM

## 2022-09-10 DIAGNOSIS — I77.810 DILATED AORTIC ROOT (HCC): ICD-10-CM

## 2022-09-10 LAB
ALT SERPL-CCNC: 29 U/L (ref 10–40)
ANION GAP SERPL CALCULATED.3IONS-SCNC: 12 MMOL/L (ref 3–16)
AST SERPL-CCNC: 28 U/L (ref 15–37)
BUN BLDV-MCNC: 15 MG/DL (ref 7–20)
CALCIUM SERPL-MCNC: 9.5 MG/DL (ref 8.3–10.6)
CHLORIDE BLD-SCNC: 102 MMOL/L (ref 99–110)
CHOLESTEROL, TOTAL: 116 MG/DL (ref 0–199)
CO2: 26 MMOL/L (ref 21–32)
CREAT SERPL-MCNC: 0.6 MG/DL (ref 0.6–1.2)
GFR AFRICAN AMERICAN: >60
GFR NON-AFRICAN AMERICAN: >60
GLUCOSE BLD-MCNC: 118 MG/DL (ref 70–99)
HDLC SERPL-MCNC: 49 MG/DL (ref 40–60)
LDL CHOLESTEROL CALCULATED: 46 MG/DL
POTASSIUM SERPL-SCNC: 4 MMOL/L (ref 3.5–5.1)
SODIUM BLD-SCNC: 140 MMOL/L (ref 136–145)
TRIGL SERPL-MCNC: 104 MG/DL (ref 0–150)
VLDLC SERPL CALC-MCNC: 21 MG/DL

## 2022-09-10 PROCEDURE — 80061 LIPID PANEL: CPT

## 2022-09-10 PROCEDURE — 84460 ALANINE AMINO (ALT) (SGPT): CPT

## 2022-09-10 PROCEDURE — 84450 TRANSFERASE (AST) (SGOT): CPT

## 2022-09-10 PROCEDURE — 80048 BASIC METABOLIC PNL TOTAL CA: CPT

## 2022-09-10 PROCEDURE — 36415 COLL VENOUS BLD VENIPUNCTURE: CPT

## 2022-09-12 ENCOUNTER — TELEPHONE (OUTPATIENT)
Dept: CARDIOLOGY CLINIC | Age: 72
End: 2022-09-12

## 2022-09-12 DIAGNOSIS — E78.1 HYPERTRIGLYCERIDEMIA: Primary | ICD-10-CM

## 2022-09-12 DIAGNOSIS — I25.10 CORONARY ARTERY CALCIFICATION SEEN ON CT SCAN: ICD-10-CM

## 2022-09-12 RX ORDER — AMLODIPINE BESYLATE 2.5 MG/1
2.5 TABLET ORAL DAILY
COMMUNITY

## 2022-09-12 RX ORDER — ASPIRIN 81 MG/1
81 TABLET ORAL DAILY
COMMUNITY

## 2022-09-12 RX ORDER — AMLODIPINE BESYLATE 5 MG/1
5 TABLET ORAL DAILY
COMMUNITY

## 2022-10-10 ENCOUNTER — HOSPITAL ENCOUNTER (OUTPATIENT)
Dept: CT IMAGING | Age: 72
Discharge: HOME | End: 2022-10-10
Payer: MEDICARE

## 2022-10-10 DIAGNOSIS — I77.810 DILATED AORTIC ROOT (HCC): ICD-10-CM

## 2022-10-10 PROCEDURE — 71270 CT THORAX DX C-/C+: CPT

## 2022-10-10 PROCEDURE — 6360000004 HC RX CONTRAST MEDICATION: Performed by: INTERNAL MEDICINE

## 2022-10-10 RX ADMIN — IOPAMIDOL 75 ML: 755 INJECTION, SOLUTION INTRAVENOUS at 10:21

## 2022-10-15 NOTE — RESULT ENCOUNTER NOTE
Aortic root size is stable. Recommend follow-up study in 1 year. We can consider MRI/MRA, as long as you do not have any metallic implant contraindications or concern about , which would reduce radiation exposure over time.

## 2022-10-17 ENCOUNTER — TELEPHONE (OUTPATIENT)
Dept: CARDIOLOGY CLINIC | Age: 72
End: 2022-10-17

## 2022-10-17 NOTE — TELEPHONE ENCOUNTER
----- Message from Yuki Kim MD sent at 10/14/2022  8:52 PM EDT -----  Aortic root size is stable. Recommend follow-up study in 1 year. We can consider MRI/MRA, as long as you do not have any metallic implant contraindications or concern about , which would reduce radiation exposure over time.

## 2022-10-17 NOTE — TELEPHONE ENCOUNTER
I spoke to patient with test results per [facility] . Patient verbalized understanding. Advised patient to call back with any questions.

## 2022-12-05 DIAGNOSIS — R93.1 ABNORMAL ECHOCARDIOGRAM: ICD-10-CM

## 2022-12-05 DIAGNOSIS — R00.1 SINUS BRADYCARDIA: Chronic | ICD-10-CM

## 2022-12-05 DIAGNOSIS — R00.2 PALPITATIONS: ICD-10-CM

## 2022-12-05 RX ORDER — AMLODIPINE BESYLATE 2.5 MG/1
2.5 TABLET ORAL DAILY
Qty: 90 TABLET | Refills: 2 | Status: SHIPPED | OUTPATIENT
Start: 2022-12-05

## 2022-12-05 RX ORDER — AMLODIPINE BESYLATE 5 MG/1
TABLET ORAL
Qty: 90 TABLET | Refills: 3 | Status: SHIPPED | OUTPATIENT
Start: 2022-12-05

## 2022-12-05 RX ORDER — ROSUVASTATIN CALCIUM 10 MG/1
10 TABLET, COATED ORAL DAILY
Qty: 90 TABLET | Refills: 2 | Status: SHIPPED | OUTPATIENT
Start: 2022-12-05

## 2022-12-05 RX ORDER — HYDROCHLOROTHIAZIDE 25 MG/1
TABLET ORAL
Qty: 90 TABLET | Refills: 2 | Status: SHIPPED | OUTPATIENT
Start: 2022-12-05

## 2022-12-05 NOTE — TELEPHONE ENCOUNTER
Received refill request for Amlodipine from [address]     Last ov: 2022 [facility]    Last EK2022    Last Refill: Historical medication    Next appointment: 2023 [facility]

## 2023-06-12 PROBLEM — E78.2 MIXED HYPERLIPIDEMIA: Status: ACTIVE | Noted: 2023-06-12

## 2023-06-19 ENCOUNTER — OFFICE VISIT (OUTPATIENT)
Dept: CARDIOLOGY CLINIC | Age: 73
End: 2023-06-19
Payer: COMMERCIAL

## 2023-06-19 VITALS
HEIGHT: 63 IN | BODY MASS INDEX: 28 KG/M2 | DIASTOLIC BLOOD PRESSURE: 62 MMHG | SYSTOLIC BLOOD PRESSURE: 120 MMHG | WEIGHT: 158 LBS | OXYGEN SATURATION: 96 % | HEART RATE: 76 BPM

## 2023-06-19 DIAGNOSIS — E78.2 MIXED HYPERLIPIDEMIA: ICD-10-CM

## 2023-06-19 DIAGNOSIS — R00.2 PALPITATIONS: ICD-10-CM

## 2023-06-19 DIAGNOSIS — I77.810 DILATED AORTIC ROOT (HCC): Primary | ICD-10-CM

## 2023-06-19 DIAGNOSIS — I10 ESSENTIAL HYPERTENSION: ICD-10-CM

## 2023-06-19 PROCEDURE — 3074F SYST BP LT 130 MM HG: CPT | Performed by: INTERNAL MEDICINE

## 2023-06-19 PROCEDURE — 99214 OFFICE O/P EST MOD 30 MIN: CPT | Performed by: INTERNAL MEDICINE

## 2023-06-19 PROCEDURE — 3078F DIAST BP <80 MM HG: CPT | Performed by: INTERNAL MEDICINE

## 2023-06-19 PROCEDURE — 1123F ACP DISCUSS/DSCN MKR DOCD: CPT | Performed by: INTERNAL MEDICINE
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
## 2023-09-11 DIAGNOSIS — R00.2 PALPITATIONS: ICD-10-CM

## 2023-09-11 DIAGNOSIS — R93.1 ABNORMAL ECHOCARDIOGRAM: ICD-10-CM

## 2023-09-11 DIAGNOSIS — R00.1 SINUS BRADYCARDIA: Chronic | ICD-10-CM

## 2023-09-11 RX ORDER — HYDROCHLOROTHIAZIDE 25 MG/1
TABLET ORAL
Qty: 90 TABLET | Refills: 3 | Status: SHIPPED | OUTPATIENT
Start: 2023-09-11

## 2023-09-11 RX ORDER — AMLODIPINE BESYLATE 2.5 MG/1
TABLET ORAL
Qty: 90 TABLET | Refills: 3 | Status: SHIPPED | OUTPATIENT
Start: 2023-09-11

## 2023-09-11 RX ORDER — ROSUVASTATIN CALCIUM 10 MG/1
10 TABLET, COATED ORAL DAILY
Qty: 90 TABLET | Refills: 3 | Status: SHIPPED | OUTPATIENT
Start: 2023-09-11

## 2023-11-17 RX ORDER — AMLODIPINE BESYLATE 5 MG/1
TABLET ORAL
Qty: 90 TABLET | Refills: 3 | Status: SHIPPED | OUTPATIENT
Start: 2023-11-17

## 2023-11-17 NOTE — TELEPHONE ENCOUNTER
Last OV: 23 [facility]  Next OV: on RC list  Last Labs: 09/10/22  Last EK22  Last Fill:   amLODIPine (NORVASC) 5 MG tablet 90 tablet 3 2022     Sig: TAKE 1 TABLET DAILY (ALONG WITH A 2.5 MG TABLET)    Sent to pharmacy as: amLODIPine Besylate 5 MG Oral Tablet (NORVASC)    Cosign for Ordering: Accepted by Danis Moore MD on 2022  5:17 PM    E-Prescribing Status: Receipt confirmed by pharmacy (2022  1:17 PM EST)

## 2023-12-11 ENCOUNTER — TELEPHONE (OUTPATIENT)
Dept: CARDIOLOGY CLINIC | Age: 73
End: 2023-12-11

## (undated) DEVICE — 3M™ STERI-DRAPE™ U-DRAPE 1015: Brand: STERI-DRAPE™

## (undated) DEVICE — PACK PROCEDURE SURG EXTREMITY MFFOP CUST

## (undated) DEVICE — NEEDLE HYPO 22GA L1.5IN BLK POLYPR HUB S STL REG BVL STR

## (undated) DEVICE — DRESSING,GAUZE,XEROFORM,CURAD,1"X8",ST: Brand: CURAD

## (undated) DEVICE — MASTISOL ADHESIVE LIQ 2/3ML

## (undated) DEVICE — GLOVE ORTHO 8   MSG9480

## (undated) DEVICE — SUTURE NONABSORBABLE MONOFILAMENT 4-0 PS-2 18 IN BLK ETHILON 1667G

## (undated) DEVICE — APPLICATOR MEDICATED 26 CC SOLUTION HI LT ORNG CHLORAPREP

## (undated) DEVICE — SYRINGE 60ML BULB IRRIG ST LF

## (undated) DEVICE — GAUZE,SPONGE,4"X4",16PLY,XRAY,STRL,LF: Brand: MEDLINE

## (undated) DEVICE — INTENDED FOR TISSUE SEPARATION, AND OTHER PROCEDURES THAT REQUIRE A SHARP SURGICAL BLADE TO PUNCTURE OR CUT.: Brand: BARD-PARKER ® STAINLESS STEEL BLADES

## (undated) DEVICE — SUTURE MCRYL + SZ 4-0 L27IN ABSRB UD L19MM PS-2 3/8 CIR MCP426H

## (undated) DEVICE — PAD,ABDOMINAL,8"X10",ST,LF: Brand: MEDLINE

## (undated) DEVICE — SOLUTION IRRIG 500ML 0.9% SOD CHL USP POUR PLAS BTL

## (undated) DEVICE — BANDAGE COMPR W4INXL12FT E DISP ESMARCH EVEN

## (undated) DEVICE — ELECTRODE PT RET AD L9FT HI MOIST COND ADH HYDRGEL CORDED

## (undated) DEVICE — GLOVE ORANGE PI 8   MSG9080

## (undated) DEVICE — SUTURE VCRL + SZ 3-0 L18IN ABSRB UD SH 1/2 CIR TAPERCUT NDL VCP864D

## (undated) DEVICE — GAUZE,SPONGE,4"X4",8PLY,STRL,LF,10/TRAY: Brand: MEDLINE

## (undated) DEVICE — GLOVE SURG SZ 6 THK91MIL LTX FREE SYN POLYISOPRENE ANTI

## (undated) DEVICE — GLOVE SURG SZ 6 L12IN THK75MIL DK GRN LTX FREE